# Patient Record
Sex: FEMALE | Race: WHITE | Employment: UNEMPLOYED | URBAN - NONMETROPOLITAN AREA
[De-identification: names, ages, dates, MRNs, and addresses within clinical notes are randomized per-mention and may not be internally consistent; named-entity substitution may affect disease eponyms.]

---

## 2017-07-25 ENCOUNTER — OFFICE VISIT (OUTPATIENT)
Dept: FAMILY MEDICINE | Facility: CLINIC | Age: 72
End: 2017-07-25
Payer: COMMERCIAL

## 2017-07-25 VITALS
OXYGEN SATURATION: 95 % | TEMPERATURE: 97.2 F | RESPIRATION RATE: 18 BRPM | DIASTOLIC BLOOD PRESSURE: 72 MMHG | HEART RATE: 68 BPM | WEIGHT: 105.2 LBS | BODY MASS INDEX: 20.04 KG/M2 | SYSTOLIC BLOOD PRESSURE: 124 MMHG

## 2017-07-25 DIAGNOSIS — Z00.00 MEDICARE ANNUAL WELLNESS VISIT, SUBSEQUENT: Primary | ICD-10-CM

## 2017-07-25 DIAGNOSIS — R63.4 LOSS OF WEIGHT: ICD-10-CM

## 2017-07-25 DIAGNOSIS — L98.9 SKIN LESION: ICD-10-CM

## 2017-07-25 DIAGNOSIS — R19.05 PERIUMBILICAL MASS: ICD-10-CM
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
## 2017-07-25 DIAGNOSIS — Z13.220 SCREENING FOR HYPERLIPIDEMIA: ICD-10-CM

## 2017-07-25 DIAGNOSIS — Z13.29 SCREENING FOR HYPOTHYROIDISM: ICD-10-CM

## 2017-07-25 DIAGNOSIS — N83.202 OVARIAN CYST, LEFT: ICD-10-CM

## 2017-07-25 PROCEDURE — 80053 COMPREHEN METABOLIC PANEL: CPT | Performed by: NURSE PRACTITIONER

## 2017-07-25 PROCEDURE — 86618 LYME DISEASE ANTIBODY: CPT | Performed by: NURSE PRACTITIONER

## 2017-07-25 PROCEDURE — 99397 PER PM REEVAL EST PAT 65+ YR: CPT | Performed by: NURSE PRACTITIONER

## 2017-07-25 PROCEDURE — 84443 ASSAY THYROID STIM HORMONE: CPT | Performed by: NURSE PRACTITIONER

## 2017-07-25 PROCEDURE — 36415 COLL VENOUS BLD VENIPUNCTURE: CPT | Performed by: NURSE PRACTITIONER

## 2017-07-25 PROCEDURE — 99214 OFFICE O/P EST MOD 30 MIN: CPT | Mod: 25 | Performed by: NURSE PRACTITIONER

## 2017-07-25 RX ORDER — BISACODYL 5 MG/1
5 TABLET, DELAYED RELEASE ORAL PRN
COMMUNITY
End: 2018-05-23

## 2017-07-25 NOTE — NURSING NOTE
"Chief Complaint   Patient presents with     Medicare Visit       Initial There were no vitals taken for this visit. Estimated body mass index is 21.15 kg/(m^2) as calculated from the following:    Height as of 2/29/16: 5' 0.75\" (1.543 m).    Weight as of 10/5/16: 111 lb (50.3 kg).  Medication Reconciliation: complete      Health Maintenance that is potentially due pending provider review:  Mammogram    Gave pt phone number/pended order to schedule mammo and/or colonoscopy(or FIT)  "

## 2017-07-25 NOTE — LETTER
[facility]  [address] [facility] [address]  Phone: [phone]    August 2, 2017      Lauren Evans  [address] [facility] [address]            Dear Lauren Evans:    This is to remind you that your provider wanted you to return to the clinic for lab test(s).  LIPID  If you are coming in for Lipids and/or Glucose testing please fast for 10-12 hours. Morning medications can be taken with water.    You may call our office at [facility] at [phone] to schedule a fasting lab only appointment.    Please disregard this notice if you have already had your labs drawn or made an appointment.        Sincerely,        Perla Julian NP/ suzi

## 2017-07-25 NOTE — PATIENT INSTRUCTIONS
We will call you with the results of your labs    Schedule appointment with Dermatology to have chest lesion evaluated    Schedule your abdominal and pelvic US.    You are due for your mammogram, please schedule: [place]Lwqx=[phone], [place]pwu=[phone], [place]Apohuf=[phone], [facility] [phone] and Duanltm=[phone]  Preventive Health Recommendations    Female Ages 65 +    Yearly exam:     See your health care provider every year in order to  o Review health changes.   o Discuss preventive care.    o Review your medicines if your doctor has prescribed any.      You no longer need a yearly Pap test unless you've had an abnormal Pap test in the past 10 years. If you have vaginal symptoms, such as bleeding or discharge, be sure to talk with your provider about a Pap test.      Every 1 to 2 years, have a mammogram.  If you are over 69, talk with your health care provider about whether or not you want to continue having screening mammograms.      Every 10 years, have a colonoscopy. Or, have a yearly FIT test (stool test). These exams will check for colon cancer.       Have a cholesterol test every 5 years, or more often if your doctor advises it.       Have a diabetes test (fasting glucose) every three years. If you are at risk for diabetes, you should have this test more often.       At age 65, have a bone density scan (DEXA) to check for osteoporosis (brittle bone disease).    Shots:    Get a flu shot each year.    Get a tetanus shot every 10 years.    Talk to your doctor about your pneumonia vaccines. There are now two you should receive - Pneumovax (PPSV 23) and Prevnar (PCV 13).    Talk to your doctor about the shingles vaccine.    Talk to your doctor about the hepatitis B vaccine.    Nutrition:     Eat at least 5 servings of fruits and vegetables each day.      Eat whole-grain bread, whole-wheat pasta and brown rice instead of white grains and rice.      Talk to your provider about Calcium and  Vitamin D.     Lifestyle    Exercise at least 150 minutes a week (30 minutes a day, 5 days a week). This will help you control your weight and prevent disease.      Limit alcohol to one drink per day.      No smoking.       Wear sunscreen to prevent skin cancer.       See your dentist twice a year for an exam and cleaning.      See your eye doctor every 1 to 2 years to screen for conditions such as glaucoma, macular degeneration and cataracts.

## 2017-07-25 NOTE — MR AVS SNAPSHOT
After Visit Summary   7/25/2017    Lauren Evans    MRN: [id number]           Patient Information     Date Of Birth          1945        Visit Information        Provider Department      7/25/2017 2:20 PM Tory Julian APRN [facility]        Today's Diagnoses     Medicare annual wellness visit, subsequent    -  1    Skin lesion        Screening for hyperlipidemia        Loss of weight        Screening for hypothyroidism        Tick bite, initial encounter        Periumbilical mass        Ovarian cyst, left          Care Instructions    We will call you with the results of your labs    Schedule appointment with Dermatology to have chest lesion evaluated    Schedule your abdominal and pelvic US.    You are due for your mammogram, please schedule: [place] RiverUedm=[phone], [place]Vgci=[phone], [place]Fjlkik=[phone], [facility] [phone] and Pyzqxoh=[phone]  Preventive Health Recommendations    Female Ages 65 +    Yearly exam:     See your health care provider every year in order to  o Review health changes.   o Discuss preventive care.    o Review your medicines if your doctor has prescribed any.      You no longer need a yearly Pap test unless you've had an abnormal Pap test in the past 10 years. If you have vaginal symptoms, such as bleeding or discharge, be sure to talk with your provider about a Pap test.      Every 1 to 2 years, have a mammogram.  If you are over 69, talk with your health care provider about whether or not you want to continue having screening mammograms.      Every 10 years, have a colonoscopy. Or, have a yearly FIT test (stool test). These exams will check for colon cancer.       Have a cholesterol test every 5 years, or more often if your doctor advises it.       Have a diabetes test (fasting glucose) every three years. If you are at risk for diabetes, you should have this test more often.       At age 65, have a bone density scan (DEXA)  to check for osteoporosis (brittle bone disease).    Shots:    Get a flu shot each year.    Get a tetanus shot every 10 years.    Talk to your doctor about your pneumonia vaccines. There are now two you should receive - Pneumovax (PPSV 23) and Prevnar (PCV 13).    Talk to your doctor about the shingles vaccine.    Talk to your doctor about the hepatitis B vaccine.    Nutrition:     Eat at least 5 servings of fruits and vegetables each day.      Eat whole-grain bread, whole-wheat pasta and brown rice instead of white grains and rice.      Talk to your provider about Calcium and Vitamin D.     Lifestyle    Exercise at least 150 minutes a week (30 minutes a day, 5 days a week). This will help you control your weight and prevent disease.      Limit alcohol to one drink per day.      No smoking.       Wear sunscreen to prevent skin cancer.       See your dentist twice a year for an exam and cleaning.      See your eye doctor every 1 to 2 years to screen for conditions such as glaucoma, macular degeneration and cataracts.          Follow-ups after your visit        Additional Services     DERMATOLOGY REFERRAL       Your provider has referred you to: [facility]: [facility] [phone]   [email or web address]    Please be aware that coverage of these services is subject to the terms and limitations of your health insurance plan.  Call member services at your health plan with any benefit or coverage questions.      Please bring the following with you to your appointment:    (1) Any X-Rays, CTs or MRIs which have been performed.  Contact the facility where they were done to arrange for  prior to your scheduled appointment.    (2) List of current medications  (3) This referral request   (4) Any documents/labs given to you for this referral                  Future tests that were ordered for you today     Open Future Orders        Priority Expected Expires Ordered    US Pelvic  "Complete w Transvaginal Routine  2018    US Abdomen Complete Routine  2018    **Lipid panel reflex to direct LDL FUTURE anytime Routine 2017            Who to contact     If you have questions or need follow up information about today's clinic visit or your schedule please contact [facility] directly at [phone].  Normal or non-critical lab and imaging results will be communicated to you by [address] [employer], letter or phone within 4 business days after the clinic has received the results. If you do not hear from us within 7 days, please contact the clinic through [employer] or phone. If you have a critical or abnormal lab result, we will notify you by phone as soon as possible.  Submit refill requests through [email or web address] or call your pharmacy and they will forward the refill request to us. Please allow 3 business days for your refill to be completed.          Additional Information About Your Visit        [email or web address] Information     [email or web address] lets you send messages to your doctor, view your test results, renew your prescriptions, schedule appointments and more. To sign up, go to [email or web address] . Click on \"Log in\" on the left side of the screen, which will take you to the Welcome page. Then click on \"Sign up Now\" on the right side of the page.     You will be asked to enter the access code listed below, as well as some personal information. Please follow the directions to create your username and password.     Your access code is: [email or web address]  Expires: 10/23/2017  3:33 PM     Your access code will  in 90 days. If you need help or a new code, please call your [place] clinic or [phone].        Care EveryWhere ID     This is your Care EveryWhere ID. This could be used by other organizations to access your [place] medical records  [id number]        Your Vitals Were     Pulse Temperature Respirations Pulse Oximetry BMI (Body Mass Index)       68 " 97.2  F (36.2  C) (Tympanic) 18 95% 20.04 kg/m2        Blood Pressure from Last 3 Encounters:   07/25/17 124/72   10/05/16 (!) 124/7   09/14/16 124/62    Weight from Last 3 Encounters:   07/25/17 105 lb 3.2 oz (47.7 kg)   10/05/16 111 lb (50.3 kg)   09/14/16 115 lb 4.8 oz (52.3 kg)              We Performed the Following     Comprehensive metabolic panel (BMP + Alb, Alk Phos, ALT, AST, Total. Bili, TP)     DERMATOLOGY REFERRAL     Lyme Disease Chyna with reflex to WB Serum     TSH with free T4 reflex          Today's Medication Changes          These changes are accurate as of: 7/25/17  3:33 PM.  If you have any questions, ask your nurse or doctor.               Stop taking these medicines if you haven't already. Please contact your care team if you have questions.     GENTLE STOOL SOFTENER PO   Stopped by:  Tory Julian APRN CNP           MELATONIN PO   Stopped by:  Tory Julian APRN CNP                    Primary Care Provider Office Phone # Fax #    LUIS ALBERTO De Los Santos [phone]       [place] [address] [place] [id number]        Equal Access to Services     IRENE ARTHUR AH: Hadii tariq talley hadasho Soomaali, waaxda luqadaha, qaybta kaalmada adeegyada, waxay idiin haygeni desai. So [facility] [phone].    ATENCIÓN: Si habla español, tiene a arcos disposición servicios gratuitos de asistencia lingüística. Llame al [phone].    We comply with applicable federal civil rights laws and [place] laws. We do not discriminate on the basis of race, color, national origin, age, disability sex, sexual orientation or gender identity.            Thank you!     Thank you for choosing [facility]  for your care. Our goal is always to provide you with excellent care. Hearing back from our patients is one way we can continue to improve our services. Please take a few minutes to complete the written survey that you may receive in the mail after  your visit with us. Thank you!             Your Updated Medication List - Protect others around you: Learn how to safely use, store and throw away your medicines at www.disposemymeds.org.          This list is accurate as of: 7/25/17  3:33 PM.  Always use your most recent med list.                   Brand Name Dispense Instructions for use Diagnosis    aspirin 81 MG tablet      1 TABLET DAILY        bisacodyl 5 MG EC tablet    DULCOLAX     Take 5 mg by mouth as needed for constipation        cholecalciferol 1000 UNIT tablet    vitamin D    100 tablet    Take 2,000 Units by mouth daily        omeprazole 20 MG CR capsule    priLOSEC    90 capsule    Take 1 capsule (20 mg) by mouth daily    Gastroesophageal reflux disease without esophagitis

## 2017-07-26 LAB
ALBUMIN SERPL-MCNC: 3.9 G/DL (ref 3.4–5)
ALP SERPL-CCNC: 69 U/L (ref 40–150)
ALT SERPL W P-5'-P-CCNC: 23 U/L (ref 0–50)
ANION GAP SERPL CALCULATED.3IONS-SCNC: 5 MMOL/L (ref 3–14)
AST SERPL W P-5'-P-CCNC: 28 U/L (ref 0–45)
B BURGDOR IGG+IGM SER QL: 0.05 (ref 0–0.89)
BILIRUB SERPL-MCNC: 0.7 MG/DL (ref 0.2–1.3)
BUN SERPL-MCNC: 8 MG/DL (ref 7–30)
CALCIUM SERPL-MCNC: 8.6 MG/DL (ref 8.5–10.1)
CHLORIDE SERPL-SCNC: 104 MMOL/L (ref 94–109)
CO2 SERPL-SCNC: 27 MMOL/L (ref 20–32)
CREAT SERPL-MCNC: 0.69 MG/DL (ref 0.52–1.04)
GFR SERPL CREATININE-BSD FRML MDRD: 84 ML/MIN/1.7M2
GLUCOSE SERPL-MCNC: 89 MG/DL (ref 70–99)
POTASSIUM SERPL-SCNC: 3.6 MMOL/L (ref 3.4–5.3)
PROT SERPL-MCNC: 6.4 G/DL (ref 6.8–8.8)
SODIUM SERPL-SCNC: 136 MMOL/L (ref 133–144)
TSH SERPL DL<=0.005 MIU/L-ACNC: 0.98 MU/L (ref 0.4–4)

## 2017-07-27 ENCOUNTER — RADIANT APPOINTMENT (OUTPATIENT)
Dept: ULTRASOUND IMAGING | Facility: CLINIC | Age: 72
End: 2017-07-27
Attending: NURSE PRACTITIONER
Payer: COMMERCIAL

## 2017-07-27 DIAGNOSIS — R19.05 PERIUMBILICAL MASS: ICD-10-CM

## 2017-07-27 PROCEDURE — 76705 ECHO EXAM OF ABDOMEN: CPT

## 2017-07-31 ENCOUNTER — RADIANT APPOINTMENT (OUTPATIENT)
Dept: MAMMOGRAPHY | Facility: CLINIC | Age: 72
End: 2017-07-31
Attending: NURSE PRACTITIONER
Payer: COMMERCIAL

## 2017-07-31 ENCOUNTER — RADIANT APPOINTMENT (OUTPATIENT)
Dept: ULTRASOUND IMAGING | Facility: CLINIC | Age: 72
End: 2017-07-31
Attending: NURSE PRACTITIONER
Payer: COMMERCIAL

## 2017-07-31 DIAGNOSIS — N83.202 OVARIAN CYST, LEFT: ICD-10-CM

## 2017-07-31 DIAGNOSIS — Z12.31 VISIT FOR SCREENING MAMMOGRAM: ICD-10-CM

## 2017-07-31 PROCEDURE — G0202 SCR MAMMO BI INCL CAD: HCPCS | Mod: TC

## 2017-07-31 PROCEDURE — 76856 US EXAM PELVIC COMPLETE: CPT

## 2017-07-31 PROCEDURE — 76830 TRANSVAGINAL US NON-OB: CPT

## 2017-11-15 DIAGNOSIS — Z13.220 SCREENING FOR HYPERLIPIDEMIA: ICD-10-CM

## 2017-11-15 PROCEDURE — 80061 LIPID PANEL: CPT | Performed by: NURSE PRACTITIONER

## 2017-11-15 PROCEDURE — 36415 COLL VENOUS BLD VENIPUNCTURE: CPT | Performed by: NURSE PRACTITIONER

## 2017-11-16 LAB
CHOLEST SERPL-MCNC: 220 MG/DL
HDLC SERPL-MCNC: 115 MG/DL
LDLC SERPL CALC-MCNC: 90 MG/DL
NONHDLC SERPL-MCNC: 105 MG/DL
TRIGL SERPL-MCNC: 74 MG/DL

## 2018-05-04 ENCOUNTER — TELEPHONE (OUTPATIENT)
Dept: FAMILY MEDICINE | Facility: CLINIC | Age: 73
End: 2018-05-04

## 2018-05-04 NOTE — TELEPHONE ENCOUNTER
Reason for Call:  Wood Tick     Detailed comments: Pt pulled of a Wood Tick off back of head. The tick looked- Light grey, little white spots with big black oval spot on back.    Phone Number Patient can be reached at: Home number on file [phone] (home)    Best Time: Any Time      Can we leave a detailed message on this number? YES    Call taken on 5/4/2018 at 3:18 PM by Madeline Schmidt

## 2018-05-04 NOTE — TELEPHONE ENCOUNTER
"S-(situation): Pt found and removed tick from head 5/1/18; calling today to report.    B-(background): Developed \"small\" bullseye rash once in the past but Lyme's came back negative (tested 7/25/17).    A-(assessment):   States she has no concerns or sx, just wondering if she should be reporting this.  Tick was removed easy,\"wasn't embedded but was stuck to my head.\"  Denies, rash, fever, itching swelling of the area, or trouble breathing.  NKA to ticks or bites.    R-(recommendations): Call if any of the above issues develop or if developing body aches, joint pain and/or severe headache.    Protocol used: Telephone Triage Protocol for Nurses, Fifth Edition    Khushi STEWART RN        "

## 2018-05-23 ENCOUNTER — OFFICE VISIT (OUTPATIENT)
Dept: FAMILY MEDICINE | Facility: CLINIC | Age: 73
End: 2018-05-23
Payer: COMMERCIAL

## 2018-05-23 DIAGNOSIS — S40.862A TICK BITE OF LEFT UPPER ARM, INITIAL ENCOUNTER: Primary | ICD-10-CM
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
## 2018-05-23 PROCEDURE — 99213 OFFICE O/P EST LOW 20 MIN: CPT | Performed by: NURSE PRACTITIONER

## 2018-05-23 ASSESSMENT — MIFFLIN-ST. JEOR: SCORE: 965.05

## 2018-05-23 NOTE — PATIENT INSTRUCTIONS
Think we got all of the tick out     Keep clean and cover with salve     Return to clinic if having symptoms     Tick Facts    Ticks are small arachnids that feed on the blood of rodents, rabbits, birds, deer, dogs, and humans. Ticks do not fly and do not drop from trees. They climb to the tips of plants along trails and attach themselves to you as you brush against the plant. Ticks may also attach to you if you come in contact with an infested animal.  Avoiding ticks  The following tips will help you avoid ticks:    When walking in tick-infested areas, tuck your pants into your boots or socks, and tuck your shirt into your pants.    Wear light-colored clothing so you can easily see any ticks that get on you.    Apply a tick repellent to pants, socks, and shoes.    Avoid brushing against the plants along trails.    Check yourself and your children at the end of each day when hiking through tick-infested areas. Don't forget to check in your hair as well.  Removing ticks  Removing ticks right away will reduce the chance of disease. Here are some tips for removing ticks:    If possible, have someone else remove the tick from you.    Protect your hands from exposure to the tick by using a tissue or rubber glove.    Ticks have hook-like barbs on their mouth, which they use to attach themselves. Use tweezers or small needle-nose pliers instead of your fingers when removing a tick. Grasp the head as close to the skin as possible. Be careful not to squeeze the body, which would inject more fluid from the tick into your skin.    Pull gently and slowly away from skin until the mouthparts let go. If the tick fails to let go, stop pulling. While holding the head with tweezers, slowly turn it 90 degrees. Then, gently pull away from the skin again. This movement may unlock the tick's jaw and make it easier to remove.    Once you have removed the tick, look closely at the bite area. If you think there are still parts of the tick  in your skin that you can't remove, contact your doctor.    Wash your hands and the bite site with soap and water.  Do not:    Crush or squeeze the tick with the tweezers.    Jerk the tick.    Burn or prick the tick.    Try to suffocate the tick with petroleum jelly or nail polish.  Identifying ticks  Most tick bites are harmless. But some ticks carry diseases, such as Lyme disease and Artemio Mountain spotted fever. These can spread to people. Lyme disease is of greatest concern. It is carried by only one type of tick, the deer tick. Many public health departments are able to identify a tick to figure out if it is the type that causes Lyme disease. If this service is available in your area, bring the removed tick in a zip-top bag or jar to the public health department for identification. If you cannot identify the tick and if you were bitten in an area of the country where there is a risk of Lyme disease, contact your doctor.  Date Last Reviewed: 9/1/2016 2000-2017 [employer]. [address] [place] [address], [place] [address]. All rights reserved. This information is not intended as a substitute for professional medical care. Always follow your healthcare professional's instructions.

## 2018-05-23 NOTE — MR AVS SNAPSHOT
After Visit Summary   5/23/2018    Lauren Evans    MRN: [id number]           Patient Information     Date Of Birth          1945        Visit Information        Provider Department      5/23/2018 2:40 PM Nasreen George APRN [facility]        Today's Diagnoses     Tick bite of left upper arm, initial encounter    -  1      Care Instructions    Think we got all of the tick out     Keep clean and cover with salve     Return to clinic if having symptoms     Tick Facts    Ticks are small arachnids that feed on the blood of rodents, rabbits, birds, deer, dogs, and humans. Ticks do not fly and do not drop from trees. They climb to the tips of plants along trails and attach themselves to you as you brush against the plant. Ticks may also attach to you if you come in contact with an infested animal.  Avoiding ticks  The following tips will help you avoid ticks:    When walking in tick-infested areas, tuck your pants into your boots or socks, and tuck your shirt into your pants.    Wear light-colored clothing so you can easily see any ticks that get on you.    Apply a tick repellent to pants, socks, and shoes.    Avoid brushing against the plants along trails.    Check yourself and your children at the end of each day when hiking through tick-infested areas. Don't forget to check in your hair as well.  Removing ticks  Removing ticks right away will reduce the chance of disease. Here are some tips for removing ticks:    If possible, have someone else remove the tick from you.    Protect your hands from exposure to the tick by using a tissue or rubber glove.    Ticks have hook-like barbs on their mouth, which they use to attach themselves. Use tweezers or small needle-nose pliers instead of your fingers when removing a tick. Grasp the head as close to the skin as possible. Be careful not to squeeze the body, which would inject more fluid from the tick into your skin.    Pull gently  and slowly away from skin until the mouthparts let go. If the tick fails to let go, stop pulling. While holding the head with tweezers, slowly turn it 90 degrees. Then, gently pull away from the skin again. This movement may unlock the tick's jaw and make it easier to remove.    Once you have removed the tick, look closely at the bite area. If you think there are still parts of the tick in your skin that you can't remove, contact your doctor.    Wash your hands and the bite site with soap and water.  Do not:    Crush or squeeze the tick with the tweezers.    Jerk the tick.    Burn or prick the tick.    Try to suffocate the tick with petroleum jelly or nail polish.  Identifying ticks  Most tick bites are harmless. But some ticks carry diseases, such as Lyme disease and Artemio Mountain spotted fever. These can spread to people. Lyme disease is of greatest concern. It is carried by only one type of tick, the deer tick. Many public health departments are able to identify a tick to figure out if it is the type that causes Lyme disease. If this service is available in your area, bring the removed tick in a zip-top bag or jar to the public health department for identification. If you cannot identify the tick and if you were bitten in an area of the country where there is a risk of Lyme disease, contact your doctor.  Date Last Reviewed: 9/1/2016 2000-2017 [employer]. [address] [place] [address], [place] [id number]. All rights reserved. This information is not intended as a substitute for professional medical care. Always follow your healthcare professional's instructions.                Follow-ups after your visit        Who to contact     If you have questions or need follow up information about today's clinic visit or your schedule please contact [facility] directly at [phone].  Normal or non-critical lab and imaging results will be communicated to you by MyChart, letter or phone within 4  business days after the clinic has received the results. If you do not hear from us within 7 days, please contact the clinic through [employer] or phone. If you have a critical or abnormal lab result, we will notify you by phone as soon as possible.  Submit refill requests through [employer] or call your pharmacy and they will forward the refill request to us. Please allow 3 business days for your refill to be completed.          Additional Information About Your Visit        Care EveryWhere ID     This is your Care EveryWhere ID. This could be used by other organizations to access your [place] medical records  [id number]        Your Vitals Were     Pulse Temperature Respirations Pulse Oximetry BMI (Body Mass Index)       78 98.8  F (37.1  C) (Tympanic) 18 96% 21.91 kg/m2        Blood Pressure from Last 3 Encounters:   05/23/18 132/78   07/25/17 124/72   10/05/16 (!) 124/7    Weight from Last 3 Encounters:   05/23/18 115 lb (52.2 kg)   07/25/17 105 lb 3.2 oz (47.7 kg)   10/05/16 111 lb (50.3 kg)              Today, you had the following     No orders found for display         Today's Medication Changes          These changes are accurate as of 5/23/18  3:10 PM.  If you have any questions, ask your nurse or doctor.               Stop taking these medicines if you haven't already. Please contact your care team if you have questions.     bisacodyl 5 MG EC tablet   Commonly known as:  DULCOLAX   Stopped by:  Nasreen George APRN CNP           omeprazole 20 MG CR capsule   Commonly known as:  priLOSEC   Stopped by:  Nasreen George APRN CNP                    Primary Care Provider Office Phone # Fax #    Tory LUIS ALBERTO Zacarias [phone]       [address] [place] [address]        Equal Access to Services     [facility] JERSON : Mili tafoyao Cass, waaxda luqadaha, qaybta kaalmada trevon, bryan desai. So [facility] [phone].    ATENCIÓN: Paul damon,  tiene a arcos disposición servicios gratuitos de asistencia lingüística. Tawanda patel [phone].    We comply with applicable federal civil rights laws and [place] laws. We do not discriminate on the basis of race, color, national origin, age, disability, sex, sexual orientation, or gender identity.            Thank you!     Thank you for choosing [facility]  for your care. Our goal is always to provide you with excellent care. Hearing back from our patients is one way we can continue to improve our services. Please take a few minutes to complete the written survey that you may receive in the mail after your visit with us. Thank you!             Your Updated Medication List - Protect others around you: Learn how to safely use, store and throw away your medicines at www.disposemymeds.org.          This list is accurate as of 5/23/18  3:10 PM.  Always use your most recent med list.                   Brand Name Dispense Instructions for use Diagnosis    aspirin 81 MG tablet      1 TABLET DAILY        cholecalciferol 1000 UNIT tablet    vitamin D3    100 tablet    Take 2,000 Units by mouth daily

## 2018-05-23 NOTE — PROGRESS NOTES
SUBJECTIVE:   Lauren Evans is a 73 year old female who presents to clinic today for the following health issues:      Tick bite      Duration: 5/21/2018    Description (location/character/radiation): left arm tick on arm    Intensity:  mild    Accompanying signs and symptoms: redness    History (similar episodes/previous evaluation): had a tick removed from arm by sister in law and not sure it got the whole body    Precipitating or alleviating factors: None    Therapies tried and outcome: None         Problem list and histories reviewed & adjusted, as indicated.  Additional history: as documented    Patient Active Problem List   Diagnosis     Malaise and fatigue     Tobacco use disorder     Loss of weight     Anxiety state     CARDIOVASCULAR SCREENING; LDL GOAL LESS THAN 130     Advanced directives, counseling/discussion     Abdominal pain, left upper quadrant     Past Surgical History:   Procedure Laterality Date     SURGICAL HISTORY OF -   1973    cervical cancer, hysterectomy, still has ovaries       Social History   Substance Use Topics     Smoking status: Current Every Day Smoker     Packs/day: 0.75     Years: 40.00     Types: Cigarettes     Smokeless tobacco: Never Used     Alcohol use 3.6 oz/week     0 Standard drinks or equivalent, 6 Cans of beer per week     Family History   Problem Relation Age of Onset     HEART DISEASE Mother      pacemaker     Eye Disorder Mother      macular degeneration     CANCER Mother      lung ca onset age 79     OSTEOPOROSIS Mother      Alzheimer Disease Father      Asthma Maternal Grandmother      DIABETES Brother      Coronary Artery Disease Brother      Celiac Disease Sister      Thyroid Disease Daughter          Current Outpatient Prescriptions   Medication Sig Dispense Refill     ASPIRIN 81 MG OR TABS 1 TABLET DAILY       cholecalciferol (VITAMIN D) 1000 UNIT tablet Take 2,000 Units by mouth daily  100 tablet 3     Allergies   Allergen Reactions     Soap      DIAL AND  PUREX      Vicodin [Acetaminophen] Itching       Reviewed and updated as needed this visit by clinical staff  Tobacco  Allergies  Meds  Problems  Med Hx  Surg Hx  Fam Hx  Soc Hx        Reviewed and updated as needed this visit by Provider  Tobacco  Allergies  Meds  Problems  Med Hx  Surg Hx  Fam Hx  Soc Hx          ROS:  Constitutional, HEENT, cardiovascular, pulmonary, gi and gu systems are negative, except as otherwise noted.    OBJECTIVE:     /78  Pulse 78  Temp 98.8  F (37.1  C) (Tympanic)  Resp 18  Wt 115 lb (52.2 kg)  SpO2 96%  BMI 21.91 kg/m2  Body mass index is 21.91 kg/(m^2).  GENERAL APPEARANCE: healthy, alert and no distress  SKIN: patient has erythematous, superficial open area on left posterior upper arm due to tick, under closer examination there was some dark residue embedded in area, removed with forceps area cleansed and left open. No erythema migrans noted on exam      Diagnostic Test Results:  none     ASSESSMENT/PLAN:     1. Tick bite of left upper arm, initial encounter  Patient with tick bite and removal 2 days ago, patient unsure if she removed all of tick. Removed dark residue embedded at site, unsure if this was part of tick or not. No erythema migrans noted, patient to keep area clean and apply salve to site. Return to clinic if signs and symptoms of infection which were discussed with patient.       Patient Instructions   Think we got all of the tick out     Keep clean and cover with salve     Return to clinic if having symptoms     Tick Facts    Ticks are small arachnids that feed on the blood of rodents, rabbits, birds, deer, dogs, and humans. Ticks do not fly and do not drop from trees. They climb to the tips of plants along trails and attach themselves to you as you brush against the plant. Ticks may also attach to you if you come in contact with an infested animal.  Avoiding ticks  The following tips will help you avoid ticks:    When walking in tick-infested  areas, tuck your pants into your boots or socks, and tuck your shirt into your pants.    Wear light-colored clothing so you can easily see any ticks that get on you.    Apply a tick repellent to pants, socks, and shoes.    Avoid brushing against the plants along trails.    Check yourself and your children at the end of each day when hiking through tick-infested areas. Don't forget to check in your hair as well.  Removing ticks  Removing ticks right away will reduce the chance of disease. Here are some tips for removing ticks:    If possible, have someone else remove the tick from you.    Protect your hands from exposure to the tick by using a tissue or rubber glove.    Ticks have hook-like barbs on their mouth, which they use to attach themselves. Use tweezers or small needle-nose pliers instead of your fingers when removing a tick. Grasp the head as close to the skin as possible. Be careful not to squeeze the body, which would inject more fluid from the tick into your skin.    Pull gently and slowly away from skin until the mouthparts let go. If the tick fails to let go, stop pulling. While holding the head with tweezers, slowly turn it 90 degrees. Then, gently pull away from the skin again. This movement may unlock the tick's jaw and make it easier to remove.    Once you have removed the tick, look closely at the bite area. If you think there are still parts of the tick in your skin that you can't remove, contact your doctor.    Wash your hands and the bite site with soap and water.  Do not:    Crush or squeeze the tick with the tweezers.    Jerk the tick.    Burn or prick the tick.    Try to suffocate the tick with petroleum jelly or nail polish.  Identifying ticks  Most tick bites are harmless. But some ticks carry diseases, such as Lyme disease and [place] spotted fever. These can spread to people. Lyme disease is of greatest concern. It is carried by only one type of tick, the deer tick. Many public  health departments are able to identify a tick to figure out if it is the type that causes Lyme disease. If this service is available in your area, bring the removed tick in a zip-top bag or jar to the public health department for identification. If you cannot identify the tick and if you were bitten in an area of the country where there is a risk of Lyme disease, contact your doctor.  Date Last Reviewed: 9/1/2016 2000-2017 [employer]. [address] [place] [address]. All rights reserved. This information is not intended as a substitute for professional medical care. Always follow your healthcare professional's instructions.            LUIS ALBERTO Narayanan CNP  [facility]

## 2018-05-23 NOTE — NURSING NOTE
"Chief Complaint   Patient presents with     Insect Bites       Initial There were no vitals taken for this visit. Estimated body mass index is 20.04 kg/(m^2) as calculated from the following:    Height as of 2/29/16: 5' 0.75\" (1.543 m).    Weight as of 7/25/17: 105 lb 3.2 oz (47.7 kg).      Health Maintenance that is potentially due pending provider review:  NONE    n/a      "

## 2019-06-28 VITALS
SYSTOLIC BLOOD PRESSURE: 132 MMHG | OXYGEN SATURATION: 96 % | RESPIRATION RATE: 18 BRPM | BODY MASS INDEX: 21.71 KG/M2 | WEIGHT: 115 LBS | HEART RATE: 78 BPM | HEIGHT: 61 IN | DIASTOLIC BLOOD PRESSURE: 78 MMHG | TEMPERATURE: 98.8 F

## 2020-04-23 ENCOUNTER — TELEPHONE (OUTPATIENT)
Dept: FAMILY MEDICINE | Facility: CLINIC | Age: 75
End: 2020-04-23

## 2020-04-23 NOTE — PATIENT INSTRUCTIONS
What is a tick bite and why should I be concerned?   Some ticks carry tick-borne diseases that can be treated prophylactically within certain time frames to prevent becoming ill from one of these diseases. Many of the [facility] are within an area in [place] that is designated as High Risk for ticks that carry these diseases. These areas include the following [facility]: [facility], [facility], [place], [place], [place], [place], [place], [place], [facility]. However, deer ticks can be found in all counties.        What kind of ticks?   The most common that carry these diseases is the deer tick. It is smaller in size and redder. No white striping on the back, slightly more triangular in appearance.          What are the time frames to watch?   If you are aware that a tick has been attached to skin for at least 36 hours you should call your health care provider . If less than 36 hours that the tick could possibly be attached to skin, just observe, as the risk of disease transmission is close to zero. If the tick was removed less than 72 hours ago, contact your health care provider to be treated prophylactically. If greater than 72 hours ago, just observe.           What are these tick bites treated with to prevent disease from transmitting?   If above criteria is met, a tick may be treated prophylactically with Doxycycline, per the recommendations of the health care team.           What if I can t take Doxycycline?   A study was done and determined by the IDSA (Infectious Disease Society of [place]) that the drug Doxycycline is the drug that assists in preventing the transmission of tick borne diseases. The medication is given in patients where the tick has been on the body for longer than 36 hours AND less than 72 hours since it was removed. This study was done in patients ages 8 and older. If you are younger than 8 years of age, have an allergy to Doxycycline, are pregnant or breastfeeding then the  recommendation is to observe for symptoms to see if they develop.          What should I know about Doxycyline?   This medication should be taken with 8oz of fluid and food to help prevent GI upset.           What do I observe for if I had a bite, but attached for less than 36 hours or removed longer that 72 hours ago?   Watch for a rash to develop. This rash will be red and raised. It will be greater than 5cm in diameter around the bite area and will grow in 24-48 hours of noticing the rash. If the rash decreases, then it is more likely a hypersensitivity to the bite and not an infectious process. You may also develop neurological symptoms such as weakness, body aches and fever. If any of these occur, you should schedule an appointment with your provider.       How can I prevent tick bites?   When walking in wooded areas, be sure to wear clothing that covers skin. Use a tick repellent. After being in wooded areas, remove clothing and check your body for crawling ticks to remove before they get a chance to attach themselves. Favorite hiding places for ticks are in hair, so carefully check hair, scalp, armpits,neck and groin areas.     What if I remove a tick and it appears that a portion is still attached?   Risk of disease is not increased with incomplete removal of tick. The tick should be removed with a slow, steady pull with fingers or tweezers. If embedded portions remain, these parts are most likely jaw parts and will work their way out in time. Surgical removal is not necessary and may increase the likelihood of infection.

## 2020-04-23 NOTE — TELEPHONE ENCOUNTER
RN Tickbite Protocol: Ages 8 and older  Lauren Evans is a 75 year old female is being assessed for indication of tick bite.     NURSING ASSESSMENT:   Tick is smaller, redder, no white striping on back and more triangular in shape? yes   Tick was removed within the last 72 hours? yes   Location on body of suspected tick bite: posterior rt knee   Symptoms:  Rash red, < 5 cm  Complicating factors:  Reports: none   Denies: none      NURSING PLAN: Nursing advice to patient See pt instructions- reviewed with pt    EDUCATION: How to identify a deer tick, Important time frames related to tick bite and Preventing tick bites    RECOMMENDED DISPOSITION:    Advised to monitor for s/sx infection, fevers, chills, headache/ body aches, weakness, joint pain or any other abnormal sx. F/U as needed.   Will comply with recommendation: Yes  Nurse Triage     Also advised due for medicare wellness visit. [facility] plans to schedule post COVID. Does not have technology for video visit.  CHAVEZ Crow RN

## 2020-04-23 NOTE — TELEPHONE ENCOUNTER
Patient is calling stating she found a wood tick on her leg. It was very tiny. She tried to pull it off but she said it broke off. There is still a part of it in the skin. She said there is a bullseye ring around it. She did put some salve on it to try to draw it out but she is wondering if she should be put on an antibiotic.    Please advise.    Demi [facility] Leonore

## 2021-05-25 ENCOUNTER — TELEPHONE (OUTPATIENT)
Dept: FAMILY MEDICINE | Facility: CLINIC | Age: 76
End: 2021-05-25

## 2021-05-25 NOTE — TELEPHONE ENCOUNTER
Reason for call:  Patient reporting a symptom    Symptom or request: Pt says she pulled a WOOD TICK off her buttocks on Sunday. She says it was on good and pulled skin so she know she got the whole thing. She says there is a bullseye around the site and white in the middle. She says it's swollen and hot. She would like to talk to a nurse.       Phone Number patient can be reached at:  Home number on file [phone] (home)    Best Time:  anytime    Can we leave a detailed message on this number:  YES    Call taken on 5/25/2021 at 11:24 AM by Sary Wayne

## 2021-05-25 NOTE — PATIENT INSTRUCTIONS
What is a tick bite and why should I be concerned?   Some ticks carry tick-borne diseases that can be treated prophylactically within certain time frames to prevent becoming ill from one of these diseases. Many of the [facility] are within an area in [place] that is designated as High Risk for ticks that carry these diseases. These areas include the following [facility]: [place], [facility], [facility], [place], [place], [place], [place], [place], [place], [facility]. However, deer ticks can be found in all counties.        What kind of ticks?   The most common that carry these diseases is the deer tick. It is smaller in size and redder. No white striping on the back, slightly more triangular in appearance.          What are the time frames to watch?   If you are aware that a tick has been attached to skin for at least 36 hours you should call your health care provider . If less than 36 hours that the tick could possibly be attached to skin, just observe, as the risk of disease transmission is close to zero. If the tick was removed less than 72 hours ago, contact your health care provider to be treated prophylactically. If greater than 72 hours ago, just observe.           What are these tick bites treated with to prevent disease from transmitting?   If above criteria is met, a tick may be treated prophylactically with Doxycycline, per the recommendations of the health care team.           What if I can t take Doxycycline?   A study was done and determined by the IDSA ([place]fectious Disease [place]) that the drug Doxycycline is the drug that assists in preventing the transmission of tick borne diseases. The medication is given in patients where the tick has been on the body for longer than 36 hours AND less than 72 hours since it was removed. This study was done in patients ages 8 and older. If you are younger than 8 years of age, have an allergy to Doxycycline, are pregnant or breastfeeding then the  recommendation is to observe for symptoms to see if they develop.          What should I know about Doxycyline?   This medication should be taken with 8oz of fluid and food to help prevent GI upset.           What do I observe for if I had a bite, but attached for less than 36 hours or removed longer that 72 hours ago?   Watch for a rash to develop. This rash will be red and raised. It will be greater than 5cm in diameter around the bite area and will grow in 24-48 hours of noticing the rash. If the rash decreases, then it is more likely a hypersensitivity to the bite and not an infectious process. You may also develop neurological symptoms such as weakness, body aches and fever. If any of these occur, you should schedule an appointment with your provider.       How can I prevent tick bites?   When walking in wooded areas, be sure to wear clothing that covers skin. Use a tick repellent. After being in wooded areas, remove clothing and check your body for crawling ticks to remove before they get a chance to attach themselves. Favorite hiding places for ticks are in hair, so carefully check hair, scalp, armpits,neck and groin areas.     What if I remove a tick and it appears that a portion is still attached?   Risk of disease is not increased with incomplete removal of tick. The tick should be removed with a slow, steady pull with fingers or tweezers. If embedded portions remain, these parts are most likely jaw parts and will work their way out in time. Surgical removal is not necessary and may increase the likelihood of infection.

## 2021-05-25 NOTE — TELEPHONE ENCOUNTER
RN Tickbite Protocol: Ages 8 and older  Lauren Evans is a 76 year old female is being assessed for indication of tick bite.     NURSING ASSESSMENT:   Removal of tick that has been attached at least 36 hours? no   Tick is smaller, redder, no white striping on back and more triangular in shape? no NOT DEER TICK  Tick was removed within the last 72 hours? yes   Location on body of suspected tick bite: rt buttock   Symptoms:  Rash red, < 5 cm, developed 24-48 hours after bite  Complicating factors:  Reports: none  Denies: Allergy to Doxycycline, Age less than 8, Breast feeding, Pregnant and Greater than 72 hours since tick removed    NURSING PLAN: Nursing advice to patient Advised to keep area clean, dry, apply OTC abx oint. Monitor area closely for s/sx infection. F/U as needed for persistent/ worsening sx.     EDUCATION: How to identify a deer tick, Important time frames related to tick bite and Preventing tick bites    RECOMMENDED DISPOSITION:  Home care advice - see pt instructions. Reviewed with pt  Will comply with recommendation: Yes   Advised due for appt/ physical/ mammo  Nurse Triage    Cata Crow RN

## 2021-09-13 ENCOUNTER — OFFICE VISIT (OUTPATIENT)
Dept: FAMILY MEDICINE | Facility: CLINIC | Age: 76
End: 2021-09-13
Payer: COMMERCIAL

## 2021-09-13 ENCOUNTER — HOSPITAL ENCOUNTER (OUTPATIENT)
Dept: MAMMOGRAPHY | Facility: CLINIC | Age: 76
Discharge: HOME | End: 2021-09-13
Attending: NURSE PRACTITIONER | Admitting: NURSE PRACTITIONER
Payer: MEDICARE

## 2021-09-13 VITALS
TEMPERATURE: 97.9 F | RESPIRATION RATE: 16 BRPM | WEIGHT: 108 LBS | BODY MASS INDEX: 21.2 KG/M2 | SYSTOLIC BLOOD PRESSURE: 138 MMHG | HEIGHT: 60 IN | HEART RATE: 76 BPM | DIASTOLIC BLOOD PRESSURE: 82 MMHG

## 2021-09-13 DIAGNOSIS — Z12.31 VISIT FOR SCREENING MAMMOGRAM: ICD-10-CM

## 2021-09-13 DIAGNOSIS — Z13.6 CARDIOVASCULAR SCREENING; LDL GOAL LESS THAN 130: ICD-10-CM

## 2021-09-13 DIAGNOSIS — Z12.11 SPECIAL SCREENING FOR MALIGNANT NEOPLASMS, COLON: ICD-10-CM

## 2021-09-13 DIAGNOSIS — Z87.891 PERSONAL HISTORY OF TOBACCO USE: ICD-10-CM

## 2021-09-13 DIAGNOSIS — E55.9 VITAMIN D DEFICIENCY: ICD-10-CM

## 2021-09-13 DIAGNOSIS — Z00.00 MEDICARE ANNUAL WELLNESS VISIT, SUBSEQUENT: Primary | ICD-10-CM

## 2021-09-13 LAB
ANION GAP SERPL CALCULATED.3IONS-SCNC: 4 MMOL/L (ref 3–14)
BUN SERPL-MCNC: 7 MG/DL (ref 7–30)
CALCIUM SERPL-MCNC: 8.4 MG/DL (ref 8.5–10.1)
CHLORIDE BLD-SCNC: 102 MMOL/L (ref 94–109)
CHOLEST SERPL-MCNC: 204 MG/DL
CO2 SERPL-SCNC: 28 MMOL/L (ref 20–32)
CREAT SERPL-MCNC: 0.62 MG/DL (ref 0.52–1.04)
FASTING STATUS PATIENT QL REPORTED: YES
GFR SERPL CREATININE-BSD FRML MDRD: 89 ML/MIN/1.73M2
GLUCOSE BLD-MCNC: 110 MG/DL (ref 70–99)
HDLC SERPL-MCNC: 112 MG/DL
LDLC SERPL CALC-MCNC: 76 MG/DL
NONHDLC SERPL-MCNC: 92 MG/DL
POTASSIUM BLD-SCNC: 4.4 MMOL/L (ref 3.4–5.3)
SODIUM SERPL-SCNC: 134 MMOL/L (ref 133–144)
TRIGL SERPL-MCNC: 82 MG/DL
TSH SERPL DL<=0.005 MIU/L-ACNC: 1.06 MU/L (ref 0.4–4)
VIT B12 SERPL-MCNC: 948 PG/ML (ref 193–986)

## 2021-09-13 PROCEDURE — 80061 LIPID PANEL: CPT | Performed by: NURSE PRACTITIONER

## 2021-09-13 PROCEDURE — 84443 ASSAY THYROID STIM HORMONE: CPT | Performed by: NURSE PRACTITIONER

## 2021-09-13 PROCEDURE — 77067 SCR MAMMO BI INCL CAD: CPT

## 2021-09-13 PROCEDURE — 82607 VITAMIN B-12: CPT | Performed by: NURSE PRACTITIONER

## 2021-09-13 PROCEDURE — 36415 COLL VENOUS BLD VENIPUNCTURE: CPT | Performed by: NURSE PRACTITIONER

## 2021-09-13 PROCEDURE — 82306 VITAMIN D 25 HYDROXY: CPT | Performed by: NURSE PRACTITIONER

## 2021-09-13 PROCEDURE — 99387 INIT PM E/M NEW PAT 65+ YRS: CPT | Performed by: NURSE PRACTITIONER

## 2021-09-13 PROCEDURE — G0296 VISIT TO DETERM LDCT ELIG: HCPCS | Performed by: NURSE PRACTITIONER

## 2021-09-13 PROCEDURE — 80048 BASIC METABOLIC PNL TOTAL CA: CPT | Performed by: NURSE PRACTITIONER

## 2021-09-13 RX ORDER — MULTIVITAMIN WITH IRON
1 TABLET ORAL DAILY
COMMUNITY

## 2021-09-13 ASSESSMENT — ENCOUNTER SYMPTOMS
ARTHRALGIAS: 0
PALPITATIONS: 0
ABDOMINAL PAIN: 0
FREQUENCY: 0
BREAST MASS: 0
HEADACHES: 0
HEMATOCHEZIA: 0
SORE THROAT: 0
NAUSEA: 0
DYSURIA: 0
EYE PAIN: 0
CONSTIPATION: 0
WEAKNESS: 0
JOINT SWELLING: 0
PARESTHESIAS: 0
SHORTNESS OF BREATH: 0
COUGH: 1
HEARTBURN: 0
MYALGIAS: 0
DIARRHEA: 0
HEMATURIA: 0
FEVER: 0
NERVOUS/ANXIOUS: 0
DIZZINESS: 0
CHILLS: 0

## 2021-09-13 ASSESSMENT — ACTIVITIES OF DAILY LIVING (ADL): CURRENT_FUNCTION: NO ASSISTANCE NEEDED

## 2021-09-13 ASSESSMENT — MIFFLIN-ST. JEOR: SCORE: 911.38

## 2021-09-13 NOTE — LETTER
September 15, 2021      Lauren Evans  [id number] [facility] [address]        Dear ,    We are writing to inform you of your test results.    Lauren that her labs looked good.  Her glucose was slightly elevated at 110 putting her in the prediabetic range if truly fasting.  Please increase physical activity to at least 30 minutes of vigorous activity 5 times per week.  Work on decreasing sugar/carbs in her diet to prevent progression to diabetes.        Resulted Orders   Lipid panel reflex to direct LDL Fasting   Result Value Ref Range    Cholesterol 204 (H) <200 mg/dL      Comment:      Age 0-19 years  Desirable: <170 mg/dL  Borderline high:  170-199 mg/dl  High:            >199 mg/dl    Age 20 years and older  Desirable: <200 mg/dL    Triglycerides 82 <150 mg/dL      Comment:      0-9 years:  Normal:    Less than 75 mg/dL  Borderline high:  75-99 mg/dL  High:             Greater than or equal to 100 mg/dL    0-19 years:  Normal:    Less than 90 mg/dL  Borderline high:   mg/dL  High:             Greater than or equal to 130 mg/dL    20 years and older:  Normal:    Less than 150 mg/dL  Borderline high:  150-199 mg/dL  High:             200-499 mg/dL  Very high:   Greater than or equal to 500 mg/dL    Direct Measure  >=50 mg/dL      Comment:      0-19 years:       Greater than or equal to 45 mg/dL   Low: Less than 40 mg/dL   Borderline low: 40-44 mg/dL     20 years and older:   Female: Greater than or equal to 50 mg/dL   Male:   Greater than or equal to 40 mg/dL         LDL Cholesterol Calculated 76 <=100 mg/dL      Comment:      Age 0-19 years:  Desirable: 0-110 mg/dL   Borderline high: 110-129 mg/dL   High: >= 130 mg/dL    Age 20 years and older:  Desirable: <100mg/dL  Above desirable: 100-129 mg/dL   Borderline high: 130-159 mg/dL   High: 160-189 mg/dL   Very high: >= 190 mg/dL    Non HDL Cholesterol 92 <130 mg/dL      Comment:      0-19 years:  Desirable:          Less than 120  mg/dL  Borderline high:   120-144 mg/dL  High:                   Greater than or equal to 145 mg/dL    20 years and older:  Desirable:          130 mg/dL  Above Desirable: 130-159 mg/dL  Borderline high:   160-189 mg/dL  High:               190-219 mg/dL  Very high:     Greater than or equal to 220 mg/dL    Patient Fasting > 8hrs? Yes    Basic metabolic panel  (Ca, Cl, CO2, Creat, Gluc, K, Na, BUN)   Result Value Ref Range    Sodium 134 133 - 144 mmol/L    Potassium 4.4 3.4 - 5.3 mmol/L    Chloride 102 94 - 109 mmol/L    Carbon Dioxide (CO2) 28 20 - 32 mmol/L    Anion Gap 4 3 - 14 mmol/L    Urea Nitrogen 7 7 - 30 mg/dL    Creatinine 0.62 0.52 - 1.04 mg/dL    Calcium 8.4 (L) 8.5 - 10.1 mg/dL    Glucose 110 (H) 70 - 99 mg/dL    GFR Estimate 89 >60 mL/min/1.73m2      Comment:      As of July 11, 2021, eGFR is calculated by the CKD-EPI creatinine equation, without race adjustment. eGFR can be influenced by muscle mass, exercise, and diet. The reported eGFR is an estimation only and is only applicable if the renal function is stable.   TSH with free T4 reflex   Result Value Ref Range    TSH 1.06 0.40 - 4.00 mU/L   Vitamin D Deficiency   Result Value Ref Range    Vitamin D, Total (25-Hydroxy) 55 20 - 75 ug/L    Narrative    Season, race, dietary intake, and treatment affect the concentration of 25-hydroxy-Vitamin D. Values may decrease during winter months and increase during summer months. Values 20-29 ug/L may indicate Vitamin D insufficiency and values <20 ug/L may indicate Vitamin D deficiency.    Vitamin D determination is routinely performed by an immunoassay specific for 25 hydroxyvitamin D3.  If an individual is on vitamin D2(ergocalciferol) supplementation, please specify 25 OH vitamin D2 and D3 level determination by LCMSMS test VITD23.     Vitamin B12   Result Value Ref Range    Vitamin B12 948 193 - 986 pg/mL       If you have any questions or concerns, please call the clinic at the number listed above.        Sincerely,      LUIS ALBERTO Navarro CNP

## 2021-09-13 NOTE — PROGRESS NOTES
"   SUBJECTIVE:   CC: Lauren Evans is an 76 year old woman who presents for preventive health visit.     {Split Bill scripting  The purpose of this visit is to discuss your medical history and prevent health problems before you are sick. You may be responsible for a co-pay, coinsurance, or deductible if your visit today includes services such as checking on a sore throat, having an x-ray or lab test, or treating and evaluating a new or existing condition :823199}  Patient has been advised of split billing requirements and indicates understanding: {Yes and No:522343}  Healthy Habits:     In general, how would you rate your overall health?  Good    Frequency of exercise:  1 day/week    Duration of exercise:  Less than 15 minutes    Do you usually eat at least 4 servings of fruit and vegetables a day, include whole grains    & fiber and avoid regularly eating high fat or \"junk\" foods?  Yes    Taking medications regularly:  Not Applicable    Medication side effects:  Not applicable    Ability to successfully perform activities of daily living:  No assistance needed    Home Safety:  No safety concerns identified    Hearing Impairment:  No hearing concerns    In the past 6 months, have you been bothered by leaking of urine? Yes    In general, how would you rate your overall mental or emotional health?  Good      PHQ-2 Total Score: 0    Additional concerns today:  No    {Add if <65 person on Medicare  - Required Questions (Optional):988025}  {Outside tests to abstract? :822899}    {additional problems to add (Optional):078482}    Today's PHQ-2 Score:   PHQ-2 ( 1999 [employer]) 9/13/2021   Q1: Little interest or pleasure in doing things 0   Q2: Feeling down, depressed or hopeless 0   PHQ-2 Score 0   Q1: Little interest or pleasure in doing things Not at all   Q2: Feeling down, depressed or hopeless Not at all   PHQ-2 Score 0       Abuse: Current or Past (Physical, Sexual or Emotional) - { :265762}  Do you feel safe in your " "environment? { :677381}    Have you ever done Advance Care Planning? (For example, a Health Directive, POLST, or a discussion with a medical provider or your loved ones about your wishes): { :906916}    Social History     Tobacco Use     Smoking status: Current Every Day Smoker     Packs/day: 0.75     Years: 40.00     Pack years: 30.00     Types: Cigarettes     Smokeless tobacco: Never Used   Substance Use Topics     Alcohol use: Yes     Alcohol/week: 6.0 standard drinks     Types: 6 Cans of beer per week     {Rooming Staff- Complete this question if Prescreen response is not shown below for today's visit. If you drink alcohol do you typically have >3 drinks per day or >7 drinks per week? (Optional):370348}    Alcohol Use 9/13/2021   Prescreen: >3 drinks/day or >7 drinks/week? Yes   Prescreen: >3 drinks/day or >7 drinks/week? -   AUDIT SCORE  4   {add AUDIT responses (Optional) (A score of 7 for adult men is an indication of hazardous drinking; a score of 8 or more is an indication of an alcohol use disorder.  A score of 7 or more for adult women is an indication of hazardous drinking or an alchohol use disorder):410770}    Reviewed orders with patient.  Reviewed health maintenance and updated orders accordingly - { :056376::\"Yes\"}  {Chronicprobdata (optional):646027}    Breast Cancer Screening:        History of abnormal Pap smear: { :394313}  PAP / HPV 1/7/2009 11/27/2007 11/3/2005   PAP (Historical) NIL NIL NIL     Reviewed and updated as needed this visit by clinical staff                 Reviewed and updated as needed this visit by Provider                {HISTORY OPTIONS (Optional):569630}    Review of Systems   Constitutional: Negative for chills and fever.   HENT: Negative for congestion, ear pain, hearing loss and sore throat.    Eyes: Negative for pain and visual disturbance.   Respiratory: Positive for cough. Negative for shortness of breath.    Cardiovascular: Negative for chest pain, palpitations and " "peripheral edema.   Gastrointestinal: Negative for abdominal pain, constipation, diarrhea, heartburn, hematochezia and nausea.   Breasts:  Negative for tenderness, breast mass and discharge.   Genitourinary: Negative for dysuria, frequency, genital sores, hematuria, pelvic pain, urgency, vaginal bleeding and vaginal discharge.   Musculoskeletal: Negative for arthralgias, joint swelling and myalgias.   Skin: Negative for rash.   Neurological: Negative for dizziness, weakness, headaches and paresthesias.   Psychiatric/Behavioral: Negative for mood changes. The patient is not nervous/anxious.      {FEMALE ROS (Optional):606278}     OBJECTIVE:   There were no vitals taken for this visit.  Physical Exam  {Exam Choices (Optional):218418}    {Diagnostic Test Results (Optional):438783::\"Diagnostic Test Results:\",\"Labs reviewed in Epic\"}    ASSESSMENT/PLAN:   {Diag Picklist:562139}    Patient has been advised of split billing requirements and indicates understanding: {YES / NO:555650::\"Yes\"}  COUNSELING:  {FEMALE COUNSELING MESSAGES:889193::\"Reviewed preventive health counseling, as reflected in patient instructions\"}    Estimated body mass index is 21.91 kg/m  as calculated from the following:    Height as of 5/23/18: 1.543 m (5' 0.75\").    Weight as of 5/23/18: 52.2 kg (115 lb).    {Weight Management Plan (ACO) Complete if BMI is abnormal-  Ages 18-64  BMI >24.9.  Age 65+ with BMI <23 or >30 (Optional):847058}    She reports that she has been smoking cigarettes. She has a 30.00 pack-year smoking history. She has never used smokeless tobacco.  Tobacco Cessation Action Plan:   {TOBACCO CESSATION ACTION PLAN:394215}      Counseling Resources:  ATP IV Guidelines  Pooled Cohorts Equation Calculator  Breast Cancer Risk Calculator  BRCA-Related Cancer Risk Assessment: FHS-7 Tool  FRAX Risk Assessment  ICSI Preventive Guidelines  Dietary Guidelines for Americans, 2010  USDA's MyPlate  ASA Prophylaxis  Lung CA Screening    Ching " LUIS ALBERTO Grigsby CNP  [facility]

## 2021-09-13 NOTE — PATIENT INSTRUCTIONS
Patient Education   Personalized Prevention Plan  You are due for the preventive services outlined below.  Your care team is available to assist you in scheduling these services.  If you have already completed any of these items, please share that information with your care team to update in your medical record.  Health Maintenance Due   Topic Date Due     ANNUAL REVIEW OF HM ORDERS  Never done     COVID-19 Vaccine (1) Never done     Zoster (Shingles) Vaccine (1 of 2) Never done     FALL RISK ASSESSMENT  07/25/2018     Colorectal Cancer Screening  05/27/2019     Mammogram  07/31/2019     Diptheria Tetanus Pertussis (DTAP/TDAP/TD) Vaccine (3 - Td or Tdap) 07/02/2020     Flu Vaccine (1) Never done       Exercise for a Healthier Heart  You may wonder how you can improve the health of your heart. If you re thinking about exercise, you re on the right track. You don t need to become an athlete. But you do need a certain amount of brisk exercise to help strengthen your heart. If you have been diagnosed with a heart condition, your healthcare provider may advise exercise to help stabilize your condition. To help make exercise a habit, choose safe, fun activities.      Exercise with a friend. When activity is fun, you're more likely to stick with it.   Before you start  Check with your healthcare provider before starting an exercise program. This is especially important if you have not been active for a while. It's also important if you have a long-term (chronic) health problem such as heart disease, diabetes, or obesity. Or if you are at high risk for having these problems.   Why exercise?  Exercising regularly offers many healthy rewards. It can help you do all of the following:     Improve your blood cholesterol level to help prevent further heart trouble    Lower your blood pressure to help prevent a stroke or heart attack    Control diabetes, or reduce your risk of getting this disease    Improve your heart and lung  function    Reach and stay at a healthy weight    Make your muscles stronger so you can stay active    Prevent falls and fractures by slowing the loss of bone mass (osteoporosis)    Manage stress better    Reduce your blood pressure    Improve your sense of self and your body image  Exercise tips      Ease into your routine. Set small goals. Then build on them. If you are not sure what your activity level should be, talk with your healthcare provider first before starting an exercise routine.    Exercise on most days. Aim for a total of 150 minutes (2 hours and 30 minutes) or more of moderate-intensity aerobic activity each week. Or 75 minutes (1 hour and 15 minutes) or more of vigorous-intensity aerobic activity each week. Or try for a combination of both. Moderate activity means that you breathe heavier and your heart rate increases but you can still talk. Think about doing 40 minutes of moderate exercise, 3 to 4 times a week. For best results, activity should last for about 40 minutes to lower blood pressure and cholesterol. It's OK to work up to the 40-minute period over time. Examples of moderate-intensity activity are walking 1 mile in 15 minutes. Or doing 30 to 45 minutes of yard work.    Step up your daily activity level.  Along with your exercise program, try being more active the whole day. Walk instead of drive. Or park further away so that you take more steps each day. Do more household tasks or yard work. You may not be able to meet the advised mount of physical activity. But doing some moderate- or vigorous-intensity aerobic activity can help reduce your risk for heart disease. Your healthcare provider can help you figure out what is best for you.    Choose 1 or more activities you enjoy.  Walking is one of the easiest things you can do. You can also try swimming, riding a bike, dancing, or taking an exercise class.    When to call your healthcare provider  Call your healthcare provider if you have any  of these:     Chest pain or feel dizzy or lightheaded    Burning, tightness, pressure, or heaviness in your chest, neck, shoulders, back, or arms    Abnormal shortness of breath    More joint or muscle pain    A very fast or irregular heartbeat (palpitations)  Max last reviewed this educational content on 7/1/2019 2000-2021 [employer]. All rights reserved. This information is not intended as a substitute for professional medical care. Always follow your healthcare professional's instructions.          Urinary Incontinence, Female (Adult)   Urinary incontinence means loss of bladder control. This problem affects many women, especially as they get older. If you have incontinence, you may be embarrassed to ask for help. But know that this problem can be treated.   Types of Incontinence  There are different types of incontinence. Two of the main types are described here. You can have more than one type.     Stress incontinence. With this type, urine leaks when pressure (stress) is put on the bladder. This may happen when you cough, sneeze, or laugh. Stress incontinence most often occurs because the pelvic floor muscles that support the bladder and urethra are weak. This can happen after pregnancy and vaginal childbirth or a hysterectomy. It can also be due to excess body weight or hormone changes.    Urge incontinence (also called overactive bladder). With this type, a sudden urge to urinate is felt often. This may happen even though there may not be much urine in the bladder. The need to urinate often during the night is common. Urge incontinence most often occurs because of bladder spasms. This may be due to bladder irritation or infection. Damage to bladder nerves or pelvic muscles, constipation, and certain medicines can also lead to urge incontinence.  Treatment depends on the cause. Further evaluation is needed to find the type you have. This will likely include an exam and certain tests.  Based on the results, you and your healthcare provider can then plan treatment. Until a diagnosis is made, the home care tips below can help ease symptoms.   Home care    Do pelvic floor muscle exercises, if they are prescribed. The pelvic floor muscles help support the bladder and urethra. Many women find that their symptoms improve when doing special exercises that strengthen these muscles. To do the exercises, contract the muscles you would use to stop your stream of urine. But do this when you re not urinating. Hold for 10 seconds, then relax. Repeat 10 to 20 times in a row, at least 3 times a day. Your healthcare provider may give you other instructions for how to do the exercises and how often.    Keep a bladder diary. This helps track how often and how much you urinate over a set period of time. Bring this diary with you to your next visit with the provider. The information can help your provider learn more about your bladder problem.    Lose weight, if advised to by your provider. Extra weight puts pressure on the bladder. Your provider can help you create a weight-loss plan that s right for you. This may include exercising more and making certain diet changes.    Don't have foods and drinks that may irritate the bladder. These can include alcohol and caffeinated drinks.    Quit smoking. Smoking and other tobacco use can lead to a long-term (chronic) cough that strains the pelvic floor muscles. Smoking may also damage the bladder and urethra. Talk with your provider about treatments or methods you can use to quit smoking.    If drinking large amounts of fluid makes you have symptoms, you may be advised to limit your fluid intake. You may also be advised to drink most of your fluids during the day and to limit fluids at night.    If you re worried about urine leakage or accidents, you may wear absorbent pads to catch urine. Change the pads often. This helps reduce discomfort. It may also reduce the risk of  skin or bladder infections.    Follow-up care  Follow up with your healthcare provider, or as directed. It may take some to find the right treatment for your problem. But healthy lifestyle changes can be made right away. These include such things as exercising on a regular basis, eating a healthy diet, losing weight (if needed), and quitting smoking. Your treatment plan may include special therapies or medicines. Certain procedures or surgery may also be options. Talk about any questions you have with your provider.   When to seek medical advice  Call the healthcare provider right away if any of these occur:    Fever of 100.4 F (38 C) or higher, or as directed by your provider    Bladder pain or fullness    Belly swelling    Nausea or vomiting    Back pain    Weakness, dizziness, or fainting  [employer] last reviewed this educational content on 1/1/2020 2000-2021 [employer]. All rights reserved. This information is not intended as a substitute for professional medical care. Always follow your healthcare professional's instructions.           Lung Cancer Screening   Frequently Asked Questions  If you are at high-risk for lung cancer, getting screened with low-dose computed tomography (LDCT) every year can help save your life. This handout offers answers to some of the most common questions about lung cancer screening. If you have other questions, please call [phone] ([phone]).     What is it?  Lung cancer screening uses special X-ray technology to create an image of your lung tissue. The exam is quick and easy and takes less than 10 seconds. We don t give you any medicine or use any needles. You can eat before and after the exam. You don t need to change your clothes as long as the clothing on your chest doesn t contain metal. But, you do need to be able to hold your breath for at least 6 seconds during the exam.    What is the goal of lung cancer screening?  The goal of lung cancer  screening is to save lives. Many times, lung cancer is not found until a person starts having physical symptoms. Lung cancer screening can help detect lung cancer in the earliest stages when it may be easier to treat.    Who should be screened for lung cancer?  We suggest lung cancer screening for anyone who is at high-risk for lung cancer. You are in the high-risk group if you:      are between the ages of 55 and 79, and    have smoked at least 1 pack of cigarettes a day for 30 or more years, and    still smoke or have quit within the past 15 years.    However, if you have a new cough or shortness of breath, you should talk to your doctor before being screened.    Some national lung health advocacy groups also recommend screening for people ages 50 to 79 who have smoked an average of 1 pack of cigarettes a day for 20 years. They must also have at least 1 other risk factor for lung cancer, not including exposure to secondhand smoke. Other risk factors are having had cancer in the past, emphysema, pulmonary fibrosis, COPD, a family history of lung cancer, or exposure to certain materials such as arsenic, asbestos, beryllium, cadmium, chromium, diesel fumes, nickel, radon or silica. Your care team can help you know if you have one of these risk factors.     Why does it matter if I have symptoms?  Certain symptoms can be a sign that you have a condition in your lungs that should be checked and treated by your doctor. These symptoms include fever, chest pain, a new or changing cough, shortness of breath that you have never felt before, coughing up blood or unexplained weight loss. Having any of these symptoms can greatly affect the results of lung cancer screening.       Should all smokers get an LDCT lung cancer screening exam?  It depends. Lung cancer screening is for a very specific group of men and women who have a history of heavy smoking over a long period of time (see  Who should be screened for lung cancer   above).  I am in the high-risk group, but have been diagnosed with cancer in the past. Is LDCT lung cancer screening right for me?  In some cases, you should not have LDCT lung screening, such as when your doctor is already following your cancer with CT scan studies. Your doctor will help you decide if LDCT lung screening is right for you.  Do I need to have a screening exam every year?  Yes. If you are in the high-risk group described earlier, you should get an LDCT lung cancer screening exam every year until you are 79, or are no longer willing or able to undergo screening and possible procedures to diagnose and treat lung cancer.  How effective is LDCT at preventing death from lung cancer?  Studies have shown that LDCT lung cancer screening can lower the risk of death from lung cancer by 20 percent in people who are at high-risk.  What are the risks?  There are some risks and limitations of LDCT lung cancer screening. We want to make sure you understand the risks and benefits, so please let us know if you have any questions. Your doctor may want to talk with you more about these risks.    Radiation exposure: As with any exam that uses radiation, there is a very small increased risk of cancer. The amount of radiation in LDCT is small--about the same amount a person would get from a mammogram. Your doctor orders the exam when he or she feels the potential benefits outweigh the risks.    False negatives: No test is perfect, including LDCT. It is possible that you may have a medical condition, including lung cancer, that is not found during your exam. This is called a false negative result.    False positives and more testing: LDCT very often finds something in the lung that could be cancer, but in fact is not. This is called a false positive result. False positive tests often cause anxiety. To make sure these findings are not cancer, you may need to have more tests. These tests will be done only if you give us  permission. Sometimes patients need a treatment that can have side effects, such as a biopsy. For more information on false positives, see  What can I expect from the results?     Findings not related to lung cancer: Your LDCT exam also takes pictures of areas of your body next to your lungs. In a very small number of cases, the CT scan will show an abnormal finding in one of these areas, such as your kidneys, adrenal glands, liver or thyroid. This finding may not be serious, but you may need more tests. Your doctor can help you decide what other tests you may need, if any.  What can I expect from the results?  About 1 out of 4 LDCT exams will find something that may need more tests. Most of the time, these findings are lung nodules. Lung nodules are very small collections of tissue in the lung. These nodules are very common, and the vast majority--more than 97 percent--are not cancer (benign). Most are normal lymph nodes or small areas of scarring from past infections.  But, if a small lung nodule is found to be cancer, the cancer can be cured more than 90 percent of the time. To know if the nodule is cancer, we may need to get more images before your next yearly screening exam. If the nodule has suspicious features (for example, it is large, has an odd shape or grows over time), we will refer you to a specialist for further testing.  Will my doctor also get the results?  Yes. Your doctor will get a copy of your results.  Is it okay to keep smoking now that there s a cancer screening exam?  No. Tobacco is one of the strongest cancer-causing agents. It causes not only lung cancer, but other cancers and cardiovascular (heart) diseases as well. The damage caused by smoking builds over time. This means that the longer you smoke, the higher your risk of disease. While it is never too late to quit, the sooner you quit, the better.  Where can I find help to quit smoking?  The best way to prevent lung cancer is to stop  smoking. If you have already quit smoking, congratulations and keep it up! For help on quitting smoking, please call [employer] at 1-032-586-MVHV ([phone]) or the American Cancer Society at 1-305.331.9224 to find local resources near you.  One-on-one health coaching:  If you d prefer to work individually with a health care provider on tobacco cessation, we offer:      Medication Therapy Management:  Our specially trained pharmacists work closely with you and your doctor to help you quit smoking.  Call [phone] or [phone] (toll free).     Can Do: Health coaching offered by [facility].  [email or web address]

## 2021-09-13 NOTE — PROGRESS NOTES
"SUBJECTIVE:   Lauren Evans is a 76 year old female who presents for Preventive Visit.      Patient has been advised of split billing requirements and indicates understanding: Yes   Are you in the first 12 months of your Medicare coverage?  No    Healthy Habits:     In general, how would you rate your overall health?  Good    Frequency of exercise:  1 day/week    Duration of exercise:  Less than 15 minutes    Do you usually eat at least 4 servings of fruit and vegetables a day, include whole grains    & fiber and avoid regularly eating high fat or \"junk\" foods?  Yes    Taking medications regularly:  Not Applicable    Medication side effects:  Not applicable    Ability to successfully perform activities of daily living:  No assistance needed    Home Safety:  No safety concerns identified    Hearing Impairment:  No hearing concerns    In the past 6 months, have you been bothered by leaking of urine? Yes    In general, how would you rate your overall mental or emotional health?  Good      PHQ-2 Total Score: 0    Additional concerns today:  No    Do you feel safe in your environment? Yes    Have you ever done Advance Care Planning? (For example, a Health Directive, POLST, or a discussion with a medical provider or your loved ones about your wishes): Yes, patient states has an Advance Care Planning document and will bring a copy to the clinic.    Fall risk  Fallen 2 or more times in the past year?: No  Any fall with injury in the past year?: No     Cognitive Screening   1) Repeat 3 items (Leader, Season, Table)    2) Clock draw: NORMAL  3) 3 item recall: Recalls 3 objects  Results: 3 items recalled: COGNITIVE IMPAIRMENT LESS LIKELY    Mini-CogTM Copyright HEIDY Amado. Licensed by the author for use in [facility]; reprinted with permission ([email or web address]). All rights reserved.        Reviewed and updated as needed this visit by clinical staff  Tobacco  Allergies  Meds  Problems  Med Hx  Surg Hx  Fam Hx  " Soc Hx          Reviewed and updated as needed this visit by Provider  Tobacco  Allergies  Meds  Problems  Med Hx  Surg Hx  Fam Hx         Social History     Tobacco Use     Smoking status: Current Every Day Smoker     Packs/day: 0.75     Years: 40.00     Pack years: 30.00     Types: Cigarettes     Smokeless tobacco: Never Used   Substance Use Topics     Alcohol use: Yes     Alcohol/week: 6.0 standard drinks     Types: 6 Cans of beer per week       Alcohol Use 9/13/2021   Prescreen: >3 drinks/day or >7 drinks/week? Yes   Prescreen: >3 drinks/day or >7 drinks/week? -   AUDIT SCORE  4     Current providers sharing in care for this patient include:   No care team member to display    The following health maintenance items are reviewed in Epic and correct as of today:  Health Maintenance Due   Topic Date Due     ANNUAL REVIEW OF HM ORDERS  Never done     COVID-19 Vaccine (1) Never done     ZOSTER IMMUNIZATION (1 of 2) Never done     FALL RISK ASSESSMENT  07/25/2018     COLORECTAL CANCER SCREENING  05/27/2019     MAMMO SCREENING  07/31/2019     DTAP/TDAP/TD IMMUNIZATION (3 - Td or Tdap) 07/02/2020     INFLUENZA VACCINE (1) Never done       Review of Systems   Constitutional: Negative for chills and fever.   HENT: Negative for congestion, ear pain, hearing loss and sore throat.    Eyes: Negative for pain and visual disturbance.   Respiratory: Positive for cough. Negative for shortness of breath.    Cardiovascular: Negative for chest pain, palpitations and peripheral edema.   Gastrointestinal: Negative for abdominal pain, constipation, diarrhea, heartburn, hematochezia and nausea.   Breasts:  Negative for tenderness, breast mass and discharge.   Genitourinary: Negative for dysuria, frequency, genital sores, hematuria, pelvic pain, urgency, vaginal bleeding and vaginal discharge.   Musculoskeletal: Negative for arthralgias, joint swelling and myalgias.   Skin: Negative for rash.   Neurological: Negative for  dizziness, weakness, headaches and paresthesias.   Psychiatric/Behavioral: Negative for mood changes. The patient is not nervous/anxious.      OBJECTIVE:   /82 (Cuff Size: Adult Regular)   Pulse 76   Temp 97.9  F (36.6  C) (Tympanic)   Resp 16   Ht 1.524 m (5')   Wt 49 kg (108 lb)   BMI 21.09 kg/m   Estimated body mass index is 21.09 kg/m  as calculated from the following:    Height as of this encounter: 1.524 m (5').    Weight as of this encounter: 49 kg (108 lb).  Physical Exam  GENERAL: healthy, alert and no distress  EYES: Eyes grossly normal to inspection, PERRL and conjunctivae and sclerae normal  HENT: ear canals and TM's normal, nose and mouth without ulcers or lesions  NECK: no adenopathy, no asymmetry, masses, or scars and thyroid normal to palpation  RESP: lungs clear to auscultation - no rales, rhonchi or wheezes  CV: regular rate and rhythm, normal S1 S2, no S3 or S4, no murmur, click or rub, no peripheral edema and peripheral pulses strong  ABDOMEN: soft, nontender, no hepatosplenomegaly, no masses and bowel sounds normal  MS: no gross musculoskeletal defects noted, no edema  SKIN: no suspicious lesions or rashes  NEURO: Normal strength and tone, mentation intact and speech normal  PSYCH: mentation appears normal, affect normal/bright    Diagnostic Test Results:  Results for orders placed or performed in visit on 09/13/21   Lipid panel reflex to direct LDL Fasting     Status: Abnormal   Result Value Ref Range    Cholesterol 204 (H) <200 mg/dL    Triglycerides 82 <150 mg/dL    Direct Measure  >=50 mg/dL    LDL Cholesterol Calculated 76 <=100 mg/dL    Non HDL Cholesterol 92 <130 mg/dL    Patient Fasting > 8hrs? Yes    Basic metabolic panel  (Ca, Cl, CO2, Creat, Gluc, K, Na, BUN)     Status: Abnormal   Result Value Ref Range    Sodium 134 133 - 144 mmol/L    Potassium 4.4 3.4 - 5.3 mmol/L    Chloride 102 94 - 109 mmol/L    Carbon Dioxide (CO2) 28 20 - 32 mmol/L    Anion Gap 4 3 - 14  mmol/L    Urea Nitrogen 7 7 - 30 mg/dL    Creatinine 0.62 0.52 - 1.04 mg/dL    Calcium 8.4 (L) 8.5 - 10.1 mg/dL    Glucose 110 (H) 70 - 99 mg/dL    GFR Estimate 89 >60 mL/min/1.73m2   TSH with free T4 reflex     Status: Normal   Result Value Ref Range    TSH 1.06 0.40 - 4.00 mU/L       ASSESSMENT / PLAN:   (Z00.00) Medicare annual wellness visit, subsequent  (primary encounter diagnosis)  Comment:   Plan: Basic metabolic panel  (Ca, Cl, CO2, Creat,         Gluc, K, Na, BUN), TSH with free T4 reflex,         Vitamin B12          (Z13.6) CARDIOVASCULAR SCREENING; LDL GOAL LESS THAN 130  Comment: some improvement in labs from last check  Plan: Lipid panel reflex to direct LDL Fasting         (Z12.11) Special screening for malignant neoplasms, colon    Plan: COLOGUARD(EXACT SCIENCES)            (Z87.891) Personal history of tobacco use  Comment: risks and benefits of screening reviewed, Lauren opted for screening.   Plan: Prof fee: Shared Decisionmaking for Lung Cancer        Screening, CT Chest Lung Cancer Scrn Low Dose         wo          (E55.9) Vitamin D deficiency  Comment: labs pending.   Plan: Vitamin D Deficiency           Patient has been advised of split billing requirements and indicates understanding: Yes  COUNSELING:  Reviewed preventive health counseling, as reflected in patient instructions    Estimated body mass index is 21.09 kg/m  as calculated from the following:    Height as of this encounter: 1.524 m (5').    Weight as of this encounter: 49 kg (108 lb).        She reports that she has been smoking cigarettes. She has a 30.00 pack-year smoking history. She has never used smokeless tobacco.  Tobacco Cessation Action Plan:   Information offered: Patient not interested at this time      Appropriate preventive services were discussed with this patient, including applicable screening as appropriate for cardiovascular disease, diabetes, osteopenia/osteoporosis, and glaucoma.  As appropriate for age/gender,  discussed screening for colorectal cancer, prostate cancer, breast cancer, and cervical cancer. Checklist reviewing preventive services available has been given to the patient.    Reviewed patients plan of care and provided an AVS. The Basic Care Plan (routine screening as documented in Health Maintenance) for Lauren meets the Care Plan requirement. This Care Plan has been established and reviewed with the Patient.    Counseling Resources:  ATP IV Guidelines  Pooled Cohorts Equation Calculator  Breast Cancer Risk Calculator  Breast Cancer: Medication to Reduce Risk  FRAX Risk Assessment  ICSI Preventive Guidelines  Dietary Guidelines for Americans, 2010  [employer] MyPlate  ASA Prophylaxis  Lung CA Screening    LUIS ALBERTO Navarro [facility]    Identified Health Risks:    She is at risk for lack of exercise and has been provided with information to increase physical activity for the benefit of her well-being.  Information on urinary incontinence and treatment options given to patient.  Lung Cancer Screening Shared Decision Making Visit     Lauren Evans is eligible for lung cancer screening on the basis of the information provided in my signed lung cancer screening order.     I have discussed with patient the risks and benefits of screening for lung cancer with low-dose CT.     The risks include:  radiation exposure: one low dose chest CT has as much ionizing radiation as about 15 chest x-rays or 6 months of background radiation living in [place]    false positives: 96% of positive findings/nodules are NOT cancer, but some might still require additional diagnostic evaluation, including biopsy  over-diagnosis: some slow growing cancers that might never have been clinically significant will be detected and treated unnecessarily     The benefit of early detection of lung cancer is contingent upon adherence to annual screening or more frequent follow up if indicated.     Furthermore,  reaping the benefits of screening requires Lauren Evans to be willing and physically able to undergo diagnostic procedures, if indicated. Although no specific guide is available for determining severity of comorbidities, it is reasonable to withhold screening in patients who have greater mortality risk from other diseases.     We did discuss that the only way to prevent lung cancer is to not smoke. Smoking cessation counseling was given, duration < 3 minutes.      I did not offer risk estimation using a calculator such as this one:    ShouldIScreen

## 2021-09-14 LAB — DEPRECATED CALCIDIOL+CALCIFEROL SERPL-MC: 55 UG/L (ref 20–75)

## 2021-10-01 ENCOUNTER — ALLIED HEALTH/NURSE VISIT (OUTPATIENT)
Dept: FAMILY MEDICINE | Facility: CLINIC | Age: 76
End: 2021-10-01
Payer: COMMERCIAL

## 2021-10-01 ENCOUNTER — VIRTUAL VISIT (OUTPATIENT)
Dept: FAMILY MEDICINE | Facility: CLINIC | Age: 76
End: 2021-10-01
Payer: COMMERCIAL

## 2021-10-01 DIAGNOSIS — J40 BRONCHITIS: ICD-10-CM

## 2021-10-01 DIAGNOSIS — F17.200 SMOKER: ICD-10-CM

## 2021-10-01 DIAGNOSIS — J06.9 UPPER RESPIRATORY TRACT INFECTION, UNSPECIFIED TYPE: Primary | ICD-10-CM

## 2021-10-01 DIAGNOSIS — J06.9 UPPER RESPIRATORY TRACT INFECTION, UNSPECIFIED TYPE: ICD-10-CM

## 2021-10-01 DIAGNOSIS — R05.9 COUGH: ICD-10-CM

## 2021-10-01 LAB — SARS-COV-2 RNA RESP QL NAA+PROBE: NEGATIVE

## 2021-10-01 PROCEDURE — U0003 INFECTIOUS AGENT DETECTION BY NUCLEIC ACID (DNA OR RNA); SEVERE ACUTE RESPIRATORY SYNDROME CORONAVIRUS 2 (SARS-COV-2) (CORONAVIRUS DISEASE [COVID-19]), AMPLIFIED PROBE TECHNIQUE, MAKING USE OF HIGH THROUGHPUT TECHNOLOGIES AS DESCRIBED BY CMS-2020-01-R: HCPCS

## 2021-10-01 PROCEDURE — 99214 OFFICE O/P EST MOD 30 MIN: CPT | Mod: 95 | Performed by: NURSE PRACTITIONER

## 2021-10-01 PROCEDURE — 99207 PR NO CHARGE LOS: CPT

## 2021-10-01 PROCEDURE — U0005 INFEC AGEN DETEC AMPLI PROBE: HCPCS

## 2021-10-01 RX ORDER — AZITHROMYCIN 250 MG/1
TABLET, FILM COATED ORAL
Qty: 6 TABLET | Refills: 0 | Status: SHIPPED | OUTPATIENT
Start: 2021-10-01 | End: 2021-10-06

## 2021-10-01 RX ORDER — BENZONATATE 200 MG/1
200 CAPSULE ORAL 3 TIMES DAILY PRN
Qty: 30 CAPSULE | Refills: 0 | Status: SHIPPED | OUTPATIENT
Start: 2021-10-01

## 2021-10-01 RX ORDER — ACETAMINOPHEN 325 MG/1
325-650 TABLET ORAL EVERY 6 HOURS PRN
COMMUNITY

## 2021-10-01 NOTE — PATIENT INSTRUCTIONS
"  Patient Education   After Your COVID-19 (Coronavirus) Test  You have been tested for COVID-19 (coronavirus).   If you'll have surgery in the next few days, we'll let you know ahead of time if you have the virus. Please call your surgeon's office with any questions.  For all other patients: Results are usually available in Patient Conversation Media within 2 to 3 days.   If you do not have a Patient Conversation Media account, you'll get a letter in the mail in about 7 to 10 days.   AskNsharehart is often the fastest way to get test results. Please sign up if you do not already have a Patient Conversation Media account. See the handout Getting COVID-19 Test Results in Patient [employer] for help.  What if my test result is positive?  If your test is positive and you have not viewed your result in Patient Conversation Media, you'll get a phone call with your result. (A positive test means that you have the virus.)     Follow the tips under \"How do I self-isolate?\" below for 10 days (20 days if you have a weak immune system).    You don't need to be retested for COVID-19 before going back to school or work. As long as you're fever-free and feeling better, you can go back to school, work and other activities after waiting the 10 or 20 days.  What if I have questions after I get my results?  If you have questions about your results, please visit our testing website at [email or web address].   After 7 to 10 days, if you have not gotten your results:     Call [phone] ([phone]) and ask to speak with our COVID-19 results team.    If you're being treated at an infusion center: Call your infusion center directly.  What are the symptoms of COVID-19?  Cough, fever and trouble breathing are the most common signs of COVID-19.  Other symptoms can include new headaches, new muscle or body aches, new and unexplained fatigue (feeling very tired), chills, sore throat, congestion (stuffy or runny nose), diarrhea (loose poop), loss of taste or smell, belly pain, and nausea or vomiting " "(feeling sick to your stomach or throwing up).  You may already have symptoms of COVID-19, or they may show up later.  What should I do if I have symptoms?  If you're having surgery: Call your surgeon's office.  For all other patients: Stay home and away from others (self-isolate) until ...    You've had no fever--and no medicine that reduces fever--for 1 full day (24 hours), AND    Other symptoms have gotten better. For example, your cough or breathing has improved, AND    At least 10 days have passed since your symptoms first started.  How do I self-isolate?    Stay in your own room, even for meals. Use your own bathroom if you can.    Stay away from others in your home. No hugging, kissing or shaking hands. No visitors.    Don't go to work, school or anywhere else.    Clean \"high touch\" surfaces often (doorknobs, counters, handles). Use household cleaning spray or wipes. You'll find a full list of  on the EPA website: www.epa.gov/pesticide-registration/list-n-disinfectants-use-against-sars-cov-2.    Cover your mouth and nose with a mask or other face covering to avoid spreading germs.    Wash your hands and face often. Use soap and water.    Caregivers in these groups are at risk for severe illness due to COVID-19:  ? People 65 years and older  ? People who live in a nursing home or long-term care facility  ? People with chronic disease (lung, heart, cancer, diabetes, kidney, liver, immunologic)  ? People who have a weakened immune system, including those who:    Are in cancer treatment    Take medicine that weakens the immune system, such as corticosteroids    Had a bone marrow or organ transplant    Have an immune deficiency    Have poorly controlled HIV or AIDS    Are obese (body mass index of 40 or higher)    Smoke regularly    Caregivers should wear gloves while washing dishes, handling laundry and cleaning bedrooms and bathrooms.    Use caution when washing and drying laundry: Don't shake dirty " laundry and use the warmest water setting that you can.    For more tips on managing your health at home, go to www.cdc.gov/coronavirus/2019-ncov/downloads/10Things.pdf.  How can I take care of myself at home?  1. Get lots of rest. Drink extra fluids (unless a doctor has told you not to).  2. Take Tylenol (acetaminophen) for fever or pain. If you have liver or kidney problems, ask your family doctor if it's OK to take Tylenol.   Adults can take either:  ? 650 mg (two 325 mg pills) every 4 to 6 hours, or   ? 1,000 mg (two 500 mg pills) every 8 hours as needed.  ? Note: Don't take more than 3,000 mg in one day. Acetaminophen is found in many medicines (both prescribed and over-the-counter medicines). Read all labels to be sure you don't take too much.   For children, check the Tylenol bottle for the right dose. The dose is based on the child's age or weight.  3. If you have other health problems (like cancer, heart failure, an organ transplant or severe kidney disease): Call your specialty clinic if you don't feel better in the next 2 days.  4. Know when to call 911. Emergency warning signs include:  ? Trouble breathing or shortness of breath  ? Chest pain or pressure that doesn't go away  ? Feeling confused like you haven't felt before, or not being able to wake up  ? Bluish-colored lips or face  5. If your doctor prescribed a blood thinner medicine: Follow their instructions.  Where can I get more information?    [facility] - About COVID-19:   [email or web address]    CDC - If You're Sick: cdc.gov/coronavirus/2019-ncov/about/steps-when-sick.html    CDC - Ending Home Isolation: www.cdc.gov/coronavirus/2019-ncov/hcp/disposition-in-home-patients.html    CDC - Caring for Someone: www.cdc.gov/coronavirus/2019-ncov/if-you-are-sick/care-for-someone.html    [facility] - Interim Guidance for Hospital Discharge to Home: www.health.[facility].[place]/diseases/coronavirus/hcp/hospdischarge.pdf    [facility]  clinical trials (COVID-19 research studies): [email or web address]-clinical-trials    Below are the COVID-19 hotlines at the [place] Department of Health ([facility]). Interpreters are available.  ? For health questions: Call 194-839-2264 or [phone] (7 a.m. to 7 p.m.)  ? For questions about schools and childcare: Call 148-024-0923 or [phone] (7 a.m. to 7 p.m.)    For informational purposes only. Not to replace the advice of your health care provider. Clinically reviewed by Infection Prevention and the [facility] COVID-19 Clinical Team. Copyright   2020 [facility]. All rights reserved. [employer] 036328 - Rev 11/11/20.

## 2021-10-01 NOTE — PROGRESS NOTES
Lauren is a 76 year old who is being evaluated via a billable telephone visit.    BLUE [employer]IVIANA PATHFINDER    What phone number would you like to be contacted at? [phone]  How would you like to obtain your AVS?     Assessment & Plan     Upper respiratory tract infection, unspecified type  covid testing today in [place] at 2 pm  Daughter will bring.   Keep sats >90 to ED with worsening of symptoms.   Symptomatic care strategies reviewed  mucinex  Vitamin D  Vitamin C  Aspirin    Cough   Albuterol Neb QID as needed  Trial for cough   - benzonatate (TESSALON) 200 MG capsule  Dispense: 30 capsule; Refill: 0    Smoker  Cessation encouraged.         Call or return to the clinic with any worsening of symptoms or no resolution. Patient/Parent verbalized understanding and is in agreement. Medication side effects reviewed.   Current Outpatient Medications   Medication Sig Dispense Refill     acetaminophen (TYLENOL) 325 MG tablet Take 325-650 mg by mouth every 6 hours as needed for mild pain       ASPIRIN 81 MG OR TABS 1 TABLET DAILY       benzonatate (TESSALON) 200 MG capsule Take 1 capsule (200 mg) by mouth 3 times daily as needed for cough 30 capsule 0     cholecalciferol (VITAMIN D) 1000 UNIT tablet Take 2,000 Units by mouth daily  100 tablet 3     magnesium 250 MG tablet Take 1 tablet by mouth daily       potassium 99 MG TABS        Chart documentation with Dragon Voice recognition Software. Although reviewed after completion, some words and grammatical errors may remain.  As the provider for this telephone/video service, I attest that I introduced myself to the patient, provided my credentials, disclosed my location, and determined that, based on a review of the patient's chart and/or a discussion with members of the patient's treatment team, a telephone/video visit is an appropriate and effective means of providing this service. The patient and I mutually agree that this visit is appropriate for telephone/video visit as  well.    LUIS ALBERTO Cochran CNP  [facility]    Deidra Aguilar is a 76 year old who presents for the following health issues     HPI     Acute Illness  Acute illness concerns: URI  Onset/Duration: 2 days  Symptoms:  Fever: no  Chills/Sweats: YES  Headache (location?): no  Sinus Pressure: no  Conjunctivitis:  no  Ear Pain: no  Rhinorrhea: YES  Congestion: no  Sore Throat: YES  Cough: YES-productive of green sputum  Wheeze: no  Decreased Appetite: YES  Nausea: no  Vomiting: no  Diarrhea: no  Dysuria/Freq.: no  Dysuria or Hematuria: no  Fatigue/Achiness: left chest pain radiates to back  - has had with pneumonia before  Sick/Strep Exposure: no  Therapies tried and outcome: mucinex    Smoking x 52 yrs  Mucus green thick  Cough hard to sleep at night.   Previous pneumonia x 2   Brother with COVID exposed on Monday when she took him to ED  Wore a Mask. Daughter had COVID recently  Not COVID vaccinated.   Relief with OTC medication some   No wheezing.     Temp 98.5 this morning  Pulse oximeter 95%      Review of Systems   Constitutional, HEENT, cardiovascular, pulmonary, GI, , musculoskeletal, neuro, skin, endocrine and psych systems are negative, except as otherwise noted.      Objective           Vitals:  No vitals were obtained today due to virtual visit.    Physical Exam   alert and no distress  PSYCH: Alert and oriented times 3; coherent speech, normal   rate and volume, able to articulate logical thoughts, able   to abstract reason, no tangential thoughts, no hallucinations   or delusions  Her affect is normal  RESP: No cough, no audible wheezing, able to talk in full sentences  Remainder of exam unable to be completed due to telephone visits    No results found for this or any previous visit (from the past 24 hour(s)).            Phone call duration: 20 minutes

## 2021-10-11 ENCOUNTER — OFFICE VISIT (OUTPATIENT)
Dept: FAMILY MEDICINE | Facility: CLINIC | Age: 76
End: 2021-10-11
Payer: COMMERCIAL

## 2021-10-11 ENCOUNTER — ANCILLARY PROCEDURE (OUTPATIENT)
Dept: GENERAL RADIOLOGY | Facility: CLINIC | Age: 76
End: 2021-10-11
Attending: NURSE PRACTITIONER
Payer: COMMERCIAL

## 2021-10-11 VITALS
RESPIRATION RATE: 16 BRPM | TEMPERATURE: 98.5 F | DIASTOLIC BLOOD PRESSURE: 80 MMHG | OXYGEN SATURATION: 97 % | HEIGHT: 60 IN | BODY MASS INDEX: 20.34 KG/M2 | HEART RATE: 71 BPM | WEIGHT: 103.6 LBS | SYSTOLIC BLOOD PRESSURE: 134 MMHG

## 2021-10-11 DIAGNOSIS — R05.9 COUGH: ICD-10-CM

## 2021-10-11 DIAGNOSIS — Z87.891 PERSONAL HISTORY OF TOBACCO USE: ICD-10-CM

## 2021-10-11 DIAGNOSIS — J18.9 PNEUMONIA OF LEFT LOWER LOBE DUE TO INFECTIOUS ORGANISM: Primary | ICD-10-CM

## 2021-10-11 PROCEDURE — 99213 OFFICE O/P EST LOW 20 MIN: CPT | Performed by: NURSE PRACTITIONER

## 2021-10-11 PROCEDURE — 71046 X-RAY EXAM CHEST 2 VIEWS: CPT | Mod: FY | Performed by: RADIOLOGY

## 2021-10-11 RX ORDER — AZITHROMYCIN 250 MG/1
TABLET, FILM COATED ORAL
Qty: 6 TABLET | Refills: 0 | Status: SHIPPED | OUTPATIENT
Start: 2021-10-11 | End: 2021-10-16

## 2021-10-11 RX ORDER — PREDNISONE 20 MG/1
TABLET ORAL
Qty: 20 TABLET | Refills: 0 | Status: SHIPPED | OUTPATIENT
Start: 2021-10-11

## 2021-10-11 ASSESSMENT — MIFFLIN-ST. JEOR: SCORE: 891.43

## 2021-10-11 NOTE — PATIENT INSTRUCTIONS
Patient Education     Treating Pneumonia   Pneumonia is an infection of one or both of the lungs. Pneumonia:    Is often caused by either a virus, fungus, or bacteria    Can be very serious, especially in babies, young children, and older adults. It s also serious for those with other long-term health problems or weakened immune systems.    Is sometimes treated at home and sometimes in the hospital    Antibiotic medicines  Antibiotics may be prescribed for pneumonia caused by bacteria. They may be pills (oral medicines) or shots (injections). Or they may be given by IV (intravenously) into a vein. If you are taking pills at home:    Fill your prescription and start taking your medicine as soon as you can.    You will likely start to feel better in a day or 2, but don t stop taking the antibiotic.    Use a pill organizer to help you remember to take your medicine.    Let your healthcare provider know if you have side effects.    Take your medicine exactly as directed on the label. Talk with your provider or pharmacist if you have any questions.  Antiviral medicines  Antiviral medicine may be prescribed for pneumonia caused by a virus. For example, antiviral medicine may be prescribed for pneumonia caused by the flu virus. Antibiotics don't work against viruses. If you are taking antiviral medicine at home:    Fill your prescription and start taking your medicine as soon as you can.    Talk with your provider or pharmacist about possible side effects.    Take the medicine exactly as instructed.  To relieve symptoms  There are many medicines that can help relieve symptoms of pneumonia. Some are prescription and some are over the counter.  Your healthcare provider may advise:    Acetaminophen or ibuprofen to lower your fever and to reduce headache or other pain    Cough medicine to loosen mucus or to reduce coughing  Check with your healthcare provider or pharmacist before taking any over-the-counter medicines. Some  over-the-counter medicines should not be used if you have certain health conditions.  Special treatments  If you are hospitalized for pneumonia, you may have other therapies, including:    Inhaled medicines to help with breathing or chest congestion    Supplemental oxygen to increase low oxygen levels  Drink fluids and eat healthy  You should eat healthy to help your body fight the infection. Drinking a lot of fluids helps to replace fluids lost from fever and to loosen mucus in your chest.    Diet. Make healthy food choices, including fruits and vegetables, lean meats and other proteins, 100% whole grain, and low-fat or no-fat dairy products.    Fluids. Drink at least 6 to 8 tall glasses a day. Water and 100% fruit or vegetable juice are best.  Get plenty of rest and sleep  You may be more tired than normal for a while. It's important to get enough sleep at night. It s also important to rest during the day. Talk with your healthcare provider if coughing or other symptoms are interfering with your sleep.  Preventing the spread of germs  The best thing you can do to prevent spreading germs is to wash your hands often. You should:    Scrub your hands with soap and warm water for 15 to 20 seconds.    Clean in between your fingers, the backs of your hands, and around your nails.    Dry your hands on a separate towel or use paper towels.  You should also:    Keep alcohol-based hand  nearby.    Make sure you also clean surfaces that you touch. Use a product that kills all types of germs.    Stay away from others until you are feeling better.  When to call your healthcare provider  Call your healthcare provider if you have any of these:    Symptoms get worse or new symptoms develop    Fever continues    Shortness of breath with normal daily activities    Side effects from your medicine    Increased mucus or mucus that is darker in color    Coughing gets worse    Chest pain when coughing or breathing  Call 911  Call  "911if any of these occur:    Lips or fingers are bluish, purple, or gray in color    Trouble breathing or wheezing    Shortness of breath that gets worse and doesn't improve with treatment    Feeling faint or dizzy    Loss of consciousness    Trouble talking or swallowing    Feeling of doom  Max last reviewed this educational content on 12/1/2019 2000-2021 [employer]. All rights reserved. This information is not intended as a substitute for professional medical care. Always follow your healthcare professional's instructions.           Patient Education     Preventing Pneumonia  Pneumonia is an infection in one or both of the lungs. Those most at risk include older adults, smokers, and people with chronic lung diseases. For example, those with conditions such as COPD (chronic obstructive pulmonary disease), including emphysema or chronic bronchitis, and asthma, and those with a weak immune system. There are some things you can do to reduce the chance that you will get pneumonia.     Prevent infection    Wash your hands often:  ? Use soap and clean, running water and scrub your hands for at least 20 seconds. That's as long as it takes to hum the \"Happy Birthday\" song 2 times.  ? Lather and scrub the backs of your hands, between your fingers, and under your nails.  ? If soap and water aren't available, use a hand  with alcohol in it.    Don't touch your face and mouth with your hands.    Take care of your teeth and gums every day and see your dentist regularly. Daily oral care and regular dental visits are important to keep your teeth and gums healthy. They also help control your lung disease. Brush your teeth with fluoride toothpaste, clean your dentures, floss, and use an antimicrobial mouth rinse that contains chlorhexidine. Doing these things every day and seeing your dentist as scheduled can help:  ? Lower the amount of bacteria in your mouth.  ? Lower the risk for pneumonia from " accidentally inhaling bacteria in your lungs on tiny droplets of saliva. This is called aspiration.    Use disposable tissues instead of a handkerchief. Throw used tissues away.    Stay away from people who have a cold or the flu.    Try to stay away from crowded places.    Get vaccinated  Talk with your healthcare provider about vaccinations and if you should get a yearly flu shot. There are now 2 different pneumonia vaccines. Both of these are needed if you have a chronic disease or fit into the higher risk category.       Get a flu shot every year as soon as it's available in your area. The flu shot helps prevent you from getting the flu and complications of the flu, such as pneumonia.    Get pneumococcal pneumonia vaccines. Talk with your healthcare provider about which pneumococcal vaccines are right for you.  Do suggested breathing exercises  Deep breathing and coughing exercises can help clear your lungs. Your healthcare provider may suggest them. If so, you will be shown how to do them. Do them as often as your provider instructs.   Take care of your body    Drink at least 6 to 8 glasses of water a day.    Eat well-balanced, healthy meals.    Brush and floss your teeth every day. It's also important to brush teeth and floss at bedtime.    Call your dentist right away if you have problems with your teeth or gums.    Don't drink alcohol.    Don t smoke and don't let people smoke in your home. Stay away from places where people are smoking.    Moving around helps keep your lungs clear. Ask your healthcare provider what type of activity is best for you. Walking is often a good choice.    Get enough rest. Sleep at least 8 hours each night. Rest or nap during the day as needed.    Ask your healthcare provider when you should schedule follow-up care to make sure the infection is gone.    Max last reviewed this educational content on 9/1/2019 2000-2021 [employer]. All rights reserved. This  information is not intended as a substitute for professional medical care. Always follow your healthcare professional's instructions.

## 2021-10-11 NOTE — PROGRESS NOTES
Assessment & Plan     Pneumonia of left lower lobe due to infectious organism    - predniSONE (DELTASONE) 20 MG tablet; Take 3 tabs by mouth daily x 3 days, then 2 tabs daily x 3 days, then 1 tab daily x 3 days, then 1/2 tab daily x 3 days.  - amoxicillin-clavulanate (AUGMENTIN) 875-125 MG tablet; Take 1 tablet by mouth 2 times daily for 10 days  - azithromycin (ZITHROMAX) 250 MG tablet; Take 2 tablets (500 mg) by mouth daily for 1 day, THEN 1 tablet (250 mg) daily for 4 days.    Cough    - XR Chest 2 Views- repeat xray in 2-3 weeks to ensure resolution.       Personal history of tobacco abuse    - PFTs    Please call [facility] Therapy in [place] to schedule your Pulmonary Function Test [phone]. Leave a message if they don't answer and they will call you back.   Hold inhalers 6 hours prior to appointment.         FUTURE APPOINTMENTS:       - Follow up in 3-5 days for symptoms that are not improving, sooner for new or worsening sx.     Patient Instructions     Patient Education     Treating Pneumonia   Pneumonia is an infection of one or both of the lungs. Pneumonia:    Is often caused by either a virus, fungus, or bacteria    Can be very serious, especially in babies, young children, and older adults. It s also serious for those with other long-term health problems or weakened immune systems.    Is sometimes treated at home and sometimes in the hospital    Antibiotic medicines  Antibiotics may be prescribed for pneumonia caused by bacteria. They may be pills (oral medicines) or shots (injections). Or they may be given by IV (intravenously) into a vein. If you are taking pills at home:    Fill your prescription and start taking your medicine as soon as you can.    You will likely start to feel better in a day or 2, but don t stop taking the antibiotic.    Use a pill organizer to help you remember to take your medicine.    Let your healthcare provider know if you have side effects.    Take your medicine  exactly as directed on the label. Talk with your provider or pharmacist if you have any questions.  Antiviral medicines  Antiviral medicine may be prescribed for pneumonia caused by a virus. For example, antiviral medicine may be prescribed for pneumonia caused by the flu virus. Antibiotics don't work against viruses. If you are taking antiviral medicine at home:    Fill your prescription and start taking your medicine as soon as you can.    Talk with your provider or pharmacist about possible side effects.    Take the medicine exactly as instructed.  To relieve symptoms  There are many medicines that can help relieve symptoms of pneumonia. Some are prescription and some are over the counter.  Your healthcare provider may advise:    Acetaminophen or ibuprofen to lower your fever and to reduce headache or other pain    Cough medicine to loosen mucus or to reduce coughing  Check with your healthcare provider or pharmacist before taking any over-the-counter medicines. Some over-the-counter medicines should not be used if you have certain health conditions.  Special treatments  If you are hospitalized for pneumonia, you may have other therapies, including:    Inhaled medicines to help with breathing or chest congestion    Supplemental oxygen to increase low oxygen levels  Drink fluids and eat healthy  You should eat healthy to help your body fight the infection. Drinking a lot of fluids helps to replace fluids lost from fever and to loosen mucus in your chest.    Diet. Make healthy food choices, including fruits and vegetables, lean meats and other proteins, 100% whole grain, and low-fat or no-fat dairy products.    Fluids. Drink at least 6 to 8 tall glasses a day. Water and 100% fruit or vegetable juice are best.  Get plenty of rest and sleep  You may be more tired than normal for a while. It's important to get enough sleep at night. It s also important to rest during the day. Talk with your healthcare provider if  coughing or other symptoms are interfering with your sleep.  Preventing the spread of germs  The best thing you can do to prevent spreading germs is to wash your hands often. You should:    Scrub your hands with soap and warm water for 15 to 20 seconds.    Clean in between your fingers, the backs of your hands, and around your nails.    Dry your hands on a separate towel or use paper towels.  You should also:    Keep alcohol-based hand  nearby.    Make sure you also clean surfaces that you touch. Use a product that kills all types of germs.    Stay away from others until you are feeling better.  When to call your healthcare provider  Call your healthcare provider if you have any of these:    Symptoms get worse or new symptoms develop    Fever continues    Shortness of breath with normal daily activities    Side effects from your medicine    Increased mucus or mucus that is darker in color    Coughing gets worse    Chest pain when coughing or breathing  Call 911  Call 911if any of these occur:    Lips or fingers are bluish, purple, or gray in color    Trouble breathing or wheezing    Shortness of breath that gets worse and doesn't improve with treatment    Feeling faint or dizzy    Loss of consciousness    Trouble talking or swallowing    Feeling of doom  Max last reviewed this educational content on 12/1/2019 2000-2021 [employer]. All rights reserved. This information is not intended as a substitute for professional medical care. Always follow your healthcare professional's instructions.           Patient Education     Preventing Pneumonia  Pneumonia is an infection in one or both of the lungs. Those most at risk include older adults, smokers, and people with chronic lung diseases. For example, those with conditions such as COPD (chronic obstructive pulmonary disease), including emphysema or chronic bronchitis, and asthma, and those with a weak immune system. There are some things you  "can do to reduce the chance that you will get pneumonia.     Prevent infection    Wash your hands often:  ? Use soap and clean, running water and scrub your hands for at least 20 seconds. That's as long as it takes to hum the \"Happy Birthday\" song 2 times.  ? Lather and scrub the backs of your hands, between your fingers, and under your nails.  ? If soap and water aren't available, use a hand  with alcohol in it.    Don't touch your face and mouth with your hands.    Take care of your teeth and gums every day and see your dentist regularly. Daily oral care and regular dental visits are important to keep your teeth and gums healthy. They also help control your lung disease. Brush your teeth with fluoride toothpaste, clean your dentures, floss, and use an antimicrobial mouth rinse that contains chlorhexidine. Doing these things every day and seeing your dentist as scheduled can help:  ? Lower the amount of bacteria in your mouth.  ? Lower the risk for pneumonia from accidentally inhaling bacteria in your lungs on tiny droplets of saliva. This is called aspiration.    Use disposable tissues instead of a handkerchief. Throw used tissues away.    Stay away from people who have a cold or the flu.    Try to stay away from crowded places.    Get vaccinated  Talk with your healthcare provider about vaccinations and if you should get a yearly flu shot. There are now 2 different pneumonia vaccines. Both of these are needed if you have a chronic disease or fit into the higher risk category.       Get a flu shot every year as soon as it's available in your area. The flu shot helps prevent you from getting the flu and complications of the flu, such as pneumonia.    Get pneumococcal pneumonia vaccines. Talk with your healthcare provider about which pneumococcal vaccines are right for you.  Do suggested breathing exercises  Deep breathing and coughing exercises can help clear your lungs. Your healthcare provider may suggest " them. If so, you will be shown how to do them. Do them as often as your provider instructs.   Take care of your body    Drink at least 6 to 8 glasses of water a day.    Eat well-balanced, healthy meals.    Brush and floss your teeth every day. It's also important to brush teeth and floss at bedtime.    Call your dentist right away if you have problems with your teeth or gums.    Don't drink alcohol.    Don t smoke and don't let people smoke in your home. Stay away from places where people are smoking.    Moving around helps keep your lungs clear. Ask your healthcare provider what type of activity is best for you. Walking is often a good choice.    Get enough rest. Sleep at least 8 hours each night. Rest or nap during the day as needed.    Ask your healthcare provider when you should schedule follow-up care to make sure the infection is gone.    Max last reviewed this educational content on 9/1/2019 2000-2021 [employer]. All rights reserved. This information is not intended as a substitute for professional medical care. Always follow your healthcare professional's instructions.               No follow-ups on file.    LUIS ALBERTO Canada CNP  [facility]    Deidra Aguilar is a 76 year old who presents for the following health issues     HPI     Acute Illness  Acute illness concerns: pneumonia  Onset/Duration: 2 weeks   Symptoms:  Fever: no  Chills/Sweats: no  Headache (location?): no  Sinus Pressure: no  Conjunctivitis:  no  Ear Pain: no  Rhinorrhea: no  Congestion: YES- chest   Sore Throat: YES- dry   Cough: YES-productive of green/yellow sputum, with shortness of breath  Wheeze: no  Decreased Appetite: no  Nausea: no  Vomiting: no  Diarrhea: no  Dysuria/Freq.: no  Dysuria or Hematuria: no  Fatigue/Achiness: YES- tired   Sick/Strep Exposure: no  Therapies tried and outcome: z-pack, benzonatate-feels much better       Review of Systems   Constitutional, HEENT,  cardiovascular, pulmonary, gi and gu systems are negative, except as otherwise noted.      Objective    /80   Pulse 71   Temp 98.5  F (36.9  C) (Tympanic)   Resp 16   Ht 1.524 m (5')   Wt 47 kg (103 lb 9.6 oz)   SpO2 97%   BMI 20.23 kg/m    Body mass index is 20.23 kg/m .  Physical Exam   GENERAL: alert and no distress  EYES: Eyes grossly normal to inspection  HENT: normal cephalic/atraumatic, ear canals and TM's normal, nose and mouth without ulcers or lesions, oropharynx clear, oral mucous membranes moist and posterior pharynx erythematous  NECK: no adenopathy, no asymmetry, masses, or scars and thyroid normal to palpation  RESP: decreased breath sounds throughout  CV: regular rate and rhythm, normal S1 S2, no S3 or S4, no murmur, click or rub, no peripheral edema and peripheral pulses strong  SKIN: no suspicious lesions or rashes  NEURO: Normal strength and tone, mentation intact and speech normal    Results for orders placed or performed in visit on 10/11/21 (from the past 24 hour(s))   XR Chest 2 Views    Narrative    CHEST TWO VIEWS  10/11/2021 3:47 PM     HISTORY:  Cough.    COMPARISON: 10/5/2016.      Impression    IMPRESSION: Hyperinflation both lungs, likely related to COPD. Hazy  infiltrate in the left lower lung is new since the previous exam, and  could be infectious in etiology. Small calcified granuloma in the  right midlung is unchanged. No pneumothorax. No pleural effusions.  Heart size appears stable. Calcified tortuous thoracic aorta.    DEE MARCELO MD         SYSTEM ID:  [id number]